# Patient Record
Sex: FEMALE | Race: WHITE | NOT HISPANIC OR LATINO | Employment: FULL TIME | URBAN - METROPOLITAN AREA
[De-identification: names, ages, dates, MRNs, and addresses within clinical notes are randomized per-mention and may not be internally consistent; named-entity substitution may affect disease eponyms.]

---

## 2017-01-25 ENCOUNTER — COMMUNICATION - HEALTHEAST (OUTPATIENT)
Dept: FAMILY MEDICINE | Facility: CLINIC | Age: 52
End: 2017-01-25

## 2017-01-25 ENCOUNTER — OFFICE VISIT - HEALTHEAST (OUTPATIENT)
Dept: FAMILY MEDICINE | Facility: CLINIC | Age: 52
End: 2017-01-25

## 2017-01-25 DIAGNOSIS — I10 ESSENTIAL HYPERTENSION, BENIGN: ICD-10-CM

## 2017-01-25 DIAGNOSIS — R05.9 COUGH: ICD-10-CM

## 2017-01-25 DIAGNOSIS — J18.9 COMMUNITY ACQUIRED PNEUMONIA: ICD-10-CM

## 2017-01-30 ENCOUNTER — AMBULATORY - HEALTHEAST (OUTPATIENT)
Dept: FAMILY MEDICINE | Facility: CLINIC | Age: 52
End: 2017-01-30

## 2017-01-30 ENCOUNTER — COMMUNICATION - HEALTHEAST (OUTPATIENT)
Dept: FAMILY MEDICINE | Facility: CLINIC | Age: 52
End: 2017-01-30

## 2017-01-30 DIAGNOSIS — J18.9 COMMUNITY ACQUIRED PNEUMONIA: ICD-10-CM

## 2017-02-15 ENCOUNTER — COMMUNICATION - HEALTHEAST (OUTPATIENT)
Dept: FAMILY MEDICINE | Facility: CLINIC | Age: 52
End: 2017-02-15

## 2017-09-03 ENCOUNTER — COMMUNICATION - HEALTHEAST (OUTPATIENT)
Dept: FAMILY MEDICINE | Facility: CLINIC | Age: 52
End: 2017-09-03

## 2017-09-03 DIAGNOSIS — F33.1 MODERATE RECURRENT MAJOR DEPRESSION (H): ICD-10-CM

## 2017-12-01 ENCOUNTER — COMMUNICATION - HEALTHEAST (OUTPATIENT)
Dept: FAMILY MEDICINE | Facility: CLINIC | Age: 52
End: 2017-12-01

## 2017-12-01 DIAGNOSIS — F33.1 MODERATE RECURRENT MAJOR DEPRESSION (H): ICD-10-CM

## 2017-12-12 ENCOUNTER — RECORDS - HEALTHEAST (OUTPATIENT)
Dept: ADMINISTRATIVE | Facility: OTHER | Age: 52
End: 2017-12-12

## 2018-02-07 ENCOUNTER — COMMUNICATION - HEALTHEAST (OUTPATIENT)
Dept: FAMILY MEDICINE | Facility: CLINIC | Age: 53
End: 2018-02-07

## 2018-02-07 ENCOUNTER — OFFICE VISIT - HEALTHEAST (OUTPATIENT)
Dept: FAMILY MEDICINE | Facility: CLINIC | Age: 53
End: 2018-02-07

## 2018-02-07 DIAGNOSIS — Z23 NEED FOR VACCINATION: ICD-10-CM

## 2018-02-07 DIAGNOSIS — R42 LIGHTHEADEDNESS: ICD-10-CM

## 2018-02-07 LAB
ANION GAP SERPL CALCULATED.3IONS-SCNC: 8 MMOL/L (ref 5–18)
BUN SERPL-MCNC: 31 MG/DL (ref 8–22)
CALCIUM SERPL-MCNC: 9.7 MG/DL (ref 8.5–10.5)
CHLORIDE BLD-SCNC: 103 MMOL/L (ref 98–107)
CO2 SERPL-SCNC: 28 MMOL/L (ref 22–31)
CREAT SERPL-MCNC: 1.65 MG/DL (ref 0.6–1.1)
GFR SERPL CREATININE-BSD FRML MDRD: 32 ML/MIN/1.73M2
GLUCOSE BLD-MCNC: 85 MG/DL (ref 70–125)
HGB BLD-MCNC: 11 G/DL (ref 12–16)
MAGNESIUM SERPL-MCNC: 2.2 MG/DL (ref 1.8–2.6)
POTASSIUM BLD-SCNC: 5.6 MMOL/L (ref 3.5–5)
SODIUM SERPL-SCNC: 139 MMOL/L (ref 136–145)
TSH SERPL DL<=0.005 MIU/L-ACNC: 1.33 UIU/ML (ref 0.3–5)

## 2018-02-07 ASSESSMENT — MIFFLIN-ST. JEOR: SCORE: 1296.88

## 2018-02-08 ENCOUNTER — COMMUNICATION - HEALTHEAST (OUTPATIENT)
Dept: FAMILY MEDICINE | Facility: CLINIC | Age: 53
End: 2018-02-08

## 2018-02-08 ENCOUNTER — AMBULATORY - HEALTHEAST (OUTPATIENT)
Dept: FAMILY MEDICINE | Facility: CLINIC | Age: 53
End: 2018-02-08

## 2018-02-08 DIAGNOSIS — E87.5 HYPERKALEMIA: ICD-10-CM

## 2018-02-08 DIAGNOSIS — Q61.3 POLYCYSTIC KIDNEY DISEASE: ICD-10-CM

## 2018-02-08 LAB
ATRIAL RATE - MUSE: 46 BPM
DIASTOLIC BLOOD PRESSURE - MUSE: NORMAL MMHG
INTERPRETATION ECG - MUSE: NORMAL
P AXIS - MUSE: 35 DEGREES
PR INTERVAL - MUSE: 174 MS
QRS DURATION - MUSE: 80 MS
QT - MUSE: 476 MS
QTC - MUSE: 416 MS
R AXIS - MUSE: 97 DEGREES
SYSTOLIC BLOOD PRESSURE - MUSE: NORMAL MMHG
T AXIS - MUSE: 68 DEGREES
VENTRICULAR RATE- MUSE: 46 BPM

## 2018-02-12 ENCOUNTER — AMBULATORY - HEALTHEAST (OUTPATIENT)
Dept: LAB | Facility: CLINIC | Age: 53
End: 2018-02-12

## 2018-02-12 DIAGNOSIS — E87.5 HYPERKALEMIA: ICD-10-CM

## 2018-02-12 LAB
ANION GAP SERPL CALCULATED.3IONS-SCNC: 9 MMOL/L (ref 5–18)
BUN SERPL-MCNC: 24 MG/DL (ref 8–22)
CALCIUM SERPL-MCNC: 8.9 MG/DL (ref 8.5–10.5)
CHLORIDE BLD-SCNC: 103 MMOL/L (ref 98–107)
CO2 SERPL-SCNC: 26 MMOL/L (ref 22–31)
CREAT SERPL-MCNC: 1.37 MG/DL (ref 0.6–1.1)
GFR SERPL CREATININE-BSD FRML MDRD: 40 ML/MIN/1.73M2
GLUCOSE BLD-MCNC: 86 MG/DL (ref 70–125)
POTASSIUM BLD-SCNC: 4.6 MMOL/L (ref 3.5–5)
SODIUM SERPL-SCNC: 138 MMOL/L (ref 136–145)

## 2018-03-02 ENCOUNTER — COMMUNICATION - HEALTHEAST (OUTPATIENT)
Dept: FAMILY MEDICINE | Facility: CLINIC | Age: 53
End: 2018-03-02

## 2018-03-02 DIAGNOSIS — F33.1 MODERATE RECURRENT MAJOR DEPRESSION (H): ICD-10-CM

## 2018-05-28 ENCOUNTER — COMMUNICATION - HEALTHEAST (OUTPATIENT)
Dept: FAMILY MEDICINE | Facility: CLINIC | Age: 53
End: 2018-05-28

## 2018-05-28 DIAGNOSIS — F33.1 MODERATE RECURRENT MAJOR DEPRESSION (H): ICD-10-CM

## 2019-01-31 ENCOUNTER — RECORDS - HEALTHEAST (OUTPATIENT)
Dept: ADMINISTRATIVE | Facility: OTHER | Age: 54
End: 2019-01-31

## 2019-02-21 ENCOUNTER — AMBULATORY - HEALTHEAST (OUTPATIENT)
Dept: FAMILY MEDICINE | Facility: CLINIC | Age: 54
End: 2019-02-21

## 2019-02-21 ENCOUNTER — COMMUNICATION - HEALTHEAST (OUTPATIENT)
Dept: FAMILY MEDICINE | Facility: CLINIC | Age: 54
End: 2019-02-21

## 2019-02-21 ENCOUNTER — OFFICE VISIT - HEALTHEAST (OUTPATIENT)
Dept: FAMILY MEDICINE | Facility: CLINIC | Age: 54
End: 2019-02-21

## 2019-02-21 DIAGNOSIS — Z00.00 ROUTINE GENERAL MEDICAL EXAMINATION AT A HEALTH CARE FACILITY: ICD-10-CM

## 2019-02-21 DIAGNOSIS — F32.A DEPRESSION, UNSPECIFIED DEPRESSION TYPE: ICD-10-CM

## 2019-02-21 DIAGNOSIS — I10 ESSENTIAL HYPERTENSION: ICD-10-CM

## 2019-02-21 DIAGNOSIS — F33.1 MODERATE RECURRENT MAJOR DEPRESSION (H): ICD-10-CM

## 2019-02-21 DIAGNOSIS — Z12.31 VISIT FOR SCREENING MAMMOGRAM: ICD-10-CM

## 2019-02-21 LAB
ALBUMIN SERPL-MCNC: 4.1 G/DL (ref 3.5–5)
ALBUMIN UR-MCNC: NEGATIVE MG/DL
ALP SERPL-CCNC: 54 U/L (ref 45–120)
ALT SERPL W P-5'-P-CCNC: 21 U/L (ref 0–45)
ANION GAP SERPL CALCULATED.3IONS-SCNC: 6 MMOL/L (ref 5–18)
APPEARANCE UR: CLEAR
AST SERPL W P-5'-P-CCNC: 29 U/L (ref 0–40)
BACTERIA #/AREA URNS HPF: ABNORMAL HPF
BASOPHILS # BLD AUTO: 0 THOU/UL (ref 0–0.2)
BASOPHILS NFR BLD AUTO: 0 % (ref 0–2)
BILIRUB SERPL-MCNC: 0.7 MG/DL (ref 0–1)
BILIRUB UR QL STRIP: NEGATIVE
BUN SERPL-MCNC: 32 MG/DL (ref 8–22)
CALCIUM SERPL-MCNC: 9.1 MG/DL (ref 8.5–10.5)
CHLORIDE BLD-SCNC: 106 MMOL/L (ref 98–107)
CHOLEST SERPL-MCNC: 200 MG/DL
CO2 SERPL-SCNC: 27 MMOL/L (ref 22–31)
COLOR UR AUTO: YELLOW
CREAT SERPL-MCNC: 1.66 MG/DL (ref 0.6–1.1)
EOSINOPHIL # BLD AUTO: 0.2 THOU/UL (ref 0–0.4)
EOSINOPHIL NFR BLD AUTO: 4 % (ref 0–6)
ERYTHROCYTE [DISTWIDTH] IN BLOOD BY AUTOMATED COUNT: 12.4 % (ref 11–14.5)
FASTING STATUS PATIENT QL REPORTED: YES
GFR SERPL CREATININE-BSD FRML MDRD: 32 ML/MIN/1.73M2
GLUCOSE BLD-MCNC: 72 MG/DL (ref 70–125)
GLUCOSE UR STRIP-MCNC: NEGATIVE MG/DL
HCT VFR BLD AUTO: 34.8 % (ref 35–47)
HDLC SERPL-MCNC: 75 MG/DL
HGB BLD-MCNC: 11.7 G/DL (ref 12–16)
HGB UR QL STRIP: ABNORMAL
KETONES UR STRIP-MCNC: NEGATIVE MG/DL
LDLC SERPL CALC-MCNC: 114 MG/DL
LEUKOCYTE ESTERASE UR QL STRIP: NEGATIVE
LYMPHOCYTES # BLD AUTO: 0.9 THOU/UL (ref 0.8–4.4)
LYMPHOCYTES NFR BLD AUTO: 25 % (ref 20–40)
MCH RBC QN AUTO: 31.5 PG (ref 27–34)
MCHC RBC AUTO-ENTMCNC: 33.6 G/DL (ref 32–36)
MCV RBC AUTO: 94 FL (ref 80–100)
MONOCYTES # BLD AUTO: 0.3 THOU/UL (ref 0–0.9)
MONOCYTES NFR BLD AUTO: 8 % (ref 2–10)
NEUTROPHILS # BLD AUTO: 2.4 THOU/UL (ref 2–7.7)
NEUTROPHILS NFR BLD AUTO: 63 % (ref 50–70)
NITRATE UR QL: NEGATIVE
PH UR STRIP: 5.5 [PH] (ref 5–8)
PLATELET # BLD AUTO: 191 THOU/UL (ref 140–440)
PMV BLD AUTO: 7.6 FL (ref 7–10)
POTASSIUM BLD-SCNC: 4.3 MMOL/L (ref 3.5–5)
PROT SERPL-MCNC: 6.8 G/DL (ref 6–8)
RBC # BLD AUTO: 3.71 MILL/UL (ref 3.8–5.4)
RBC #/AREA URNS AUTO: ABNORMAL HPF
SODIUM SERPL-SCNC: 139 MMOL/L (ref 136–145)
SP GR UR STRIP: <=1.005 (ref 1–1.03)
SQUAMOUS #/AREA URNS AUTO: ABNORMAL LPF
TRIGL SERPL-MCNC: 54 MG/DL
UROBILINOGEN UR STRIP-ACNC: ABNORMAL
WBC #/AREA URNS AUTO: ABNORMAL HPF
WBC: 3.8 THOU/UL (ref 4–11)

## 2019-02-21 RX ORDER — FERROUS SULFATE 325(65) MG
1 TABLET ORAL
Status: SHIPPED | COMMUNITY
Start: 2019-02-21

## 2019-02-21 ASSESSMENT — MIFFLIN-ST. JEOR: SCORE: 1317.75

## 2019-02-22 LAB — 25(OH)D3 SERPL-MCNC: 79.2 NG/ML (ref 30–80)

## 2019-02-24 ENCOUNTER — AMBULATORY - HEALTHEAST (OUTPATIENT)
Dept: FAMILY MEDICINE | Facility: CLINIC | Age: 54
End: 2019-02-24

## 2019-02-24 DIAGNOSIS — D22.9 CHANGE IN MOLE: ICD-10-CM

## 2019-02-25 LAB
BKR LAB AP ABNORMAL BLEEDING: NO
BKR LAB AP BIRTH CONTROL/HORMONES: NORMAL
BKR LAB AP CERVICAL APPEARANCE: NORMAL
BKR LAB AP GYN ADEQUACY: NORMAL
BKR LAB AP GYN INTERPRETATION: NORMAL
BKR LAB AP HPV REFLEX: NORMAL
BKR LAB AP LMP: NORMAL
BKR LAB AP PATIENT STATUS: NORMAL
BKR LAB AP PREVIOUS ABNORMAL: NORMAL
BKR LAB AP PREVIOUS NORMAL: NO
HIGH RISK?: NO
PATH REPORT.COMMENTS IMP SPEC: NORMAL
RESULT FLAG (HE HISTORICAL CONVERSION): NORMAL

## 2019-02-26 ENCOUNTER — COMMUNICATION - HEALTHEAST (OUTPATIENT)
Dept: FAMILY MEDICINE | Facility: CLINIC | Age: 54
End: 2019-02-26

## 2019-02-28 ENCOUNTER — COMMUNICATION - HEALTHEAST (OUTPATIENT)
Dept: FAMILY MEDICINE | Facility: CLINIC | Age: 54
End: 2019-02-28

## 2019-02-28 DIAGNOSIS — F33.1 MODERATE RECURRENT MAJOR DEPRESSION (H): ICD-10-CM

## 2019-03-05 ENCOUNTER — RECORDS - HEALTHEAST (OUTPATIENT)
Dept: ADMINISTRATIVE | Facility: OTHER | Age: 54
End: 2019-03-05

## 2019-03-29 ENCOUNTER — HOSPITAL ENCOUNTER (OUTPATIENT)
Dept: MAMMOGRAPHY | Facility: CLINIC | Age: 54
Discharge: HOME | End: 2019-03-29
Attending: FAMILY MEDICINE

## 2019-03-29 DIAGNOSIS — Z12.31 VISIT FOR SCREENING MAMMOGRAM: ICD-10-CM

## 2019-07-08 ENCOUNTER — RECORDS - HEALTHEAST (OUTPATIENT)
Dept: ADMINISTRATIVE | Facility: OTHER | Age: 54
End: 2019-07-08

## 2019-10-03 ENCOUNTER — RECORDS - HEALTHEAST (OUTPATIENT)
Dept: ADMINISTRATIVE | Facility: OTHER | Age: 54
End: 2019-10-03

## 2020-02-05 ENCOUNTER — COMMUNICATION - HEALTHEAST (OUTPATIENT)
Dept: FAMILY MEDICINE | Facility: CLINIC | Age: 55
End: 2020-02-05

## 2020-02-05 DIAGNOSIS — F33.1 MODERATE RECURRENT MAJOR DEPRESSION (H): ICD-10-CM

## 2020-05-01 ENCOUNTER — COMMUNICATION - HEALTHEAST (OUTPATIENT)
Dept: FAMILY MEDICINE | Facility: CLINIC | Age: 55
End: 2020-05-01

## 2020-05-01 DIAGNOSIS — F33.1 MODERATE RECURRENT MAJOR DEPRESSION (H): ICD-10-CM

## 2020-07-23 ENCOUNTER — AMBULATORY - HEALTHEAST (OUTPATIENT)
Dept: LAB | Facility: CLINIC | Age: 55
End: 2020-07-23

## 2020-07-23 DIAGNOSIS — Q61.2 AUTOSOMAL DOMINANT POLYCYSTIC KIDNEY DISEASE: ICD-10-CM

## 2020-08-01 ENCOUNTER — COMMUNICATION - HEALTHEAST (OUTPATIENT)
Dept: FAMILY MEDICINE | Facility: CLINIC | Age: 55
End: 2020-08-01

## 2020-08-01 DIAGNOSIS — F33.1 MODERATE RECURRENT MAJOR DEPRESSION (H): ICD-10-CM

## 2020-10-13 ENCOUNTER — AMBULATORY - HEALTHEAST (OUTPATIENT)
Dept: FAMILY MEDICINE | Facility: CLINIC | Age: 55
End: 2020-10-13

## 2020-10-13 ENCOUNTER — VIRTUAL VISIT (OUTPATIENT)
Dept: FAMILY MEDICINE | Facility: OTHER | Age: 55
End: 2020-10-13

## 2020-10-13 DIAGNOSIS — Z20.822 SUSPECTED COVID-19 VIRUS INFECTION: ICD-10-CM

## 2020-10-13 NOTE — PROGRESS NOTES
"Date: 10/13/2020 09:56:41  Clinician: Vinny Brown  Clinician NPI: [id number]  Patient: Paty Addison  Patient : 1965  Patient Address: [address] [place] [address], [place] [address]  Patient Phone: [phone]  Visit Protocol: URI  Patient Summary:  Paty is a 55 year old ( : 1965 ) female who initiated a [facility] Visit for COVID-19 (Coronavirus) evaluation and screening. When asked the question \"Please sign me up to receive news, health information and promotions from [facility].\", Paty responded \"Yes\".    Paty states her symptoms started suddenly 2-3 weeks ago.   Her symptoms consist of wheezing, a cough, nasal congestion, rhinitis, and malaise. She is experiencing mild difficulty breathing with activities but can speak normally in full sentences.   Symptom details     Nasal secretions: The color of her mucus is yellow.    Cough: Paty coughs every 5-10 minutes and her cough is not more bothersome at night. Phlegm comes into her throat when she coughs. She does not believe her cough is caused by post-nasal drip. The color of the phlegm is yellow.     Wheezing: Paty has not ever been diagnosed with asthma. Additional wheezing details as reported by the patient (free text): When my mouth is closed I breath through my nose, I can hear the wheezing.        Paty denies having ear pain, headache, fever, enlarged lymph nodes, anosmia, vomiting, nausea, facial pain or pressure, myalgias, chills, sore throat, teeth pain, ageusia, and diarrhea. She also denies double sickening (worsening symptoms after initial improvement), taking antibiotic medication in the past month, and having recent facial or sinus surgery in the past 60 days.   Precipitating events  She has not recently been exposed to someone with influenza. Paty has been in close contact with the following high risk individuals: immunocompromised people.   Pertinent COVID-19 (Coronavirus) information  In the past 14 days, Paty has not worked " in a congregate living setting.   She does not work or volunteer as healthcare worker or a  and does not work or volunteer in a healthcare facility.   Paty also has not lived in a congregate living setting in the past 14 days. She does not live with a healthcare worker.   Paty has not had a close contact with a laboratory-confirmed COVID-19 patient within 14 days of symptom onset.   Since December 2019, Paty and has not had upper respiratory infection or influenza-like illness. Has not been diagnosed with lab-confirmed COVID-19 test   Pertinent medical history  Paty does not get yeast infections when she takes antibiotics.   Paty does not need a return to work/school note.   Weight: 148 lbs   Paty does not smoke or use smokeless tobacco.   Weight: 148 lbs    MEDICATIONS: paroxetine oral, losartan oral, ALLERGIES: NKDA  Clinician Response:  Dear Paty,   Your symptoms show that you may have coronavirus (COVID-19). This illness can cause fever, cough and trouble breathing. Many people get a mild case and get better on their own. Some people can get very sick.  What should I do?  We would like to test you for this virus.   1. Please call [phone] to schedule your visit. Explain that you were referred by [facility] to have a COVID-19 test. Be ready to share your [facility] visit ID number.  The following will serve as your written order for this COVID Test, ordered by me, for the indication of suspected COVID [Z20.828]: The test will be ordered in [employer], our electronic health record, after you are scheduled. It will show as ordered and authorized by Alessandro Hendricks MD.  Order: COVID-19 (Coronavirus) PCR for SYMPTOMATIC testing from [facility].      2. When it's time for your COVID test:  Stay at least 6 feet away from others. (If someone will drive you to your test, stay in the backseat, as far away from the  as you can.)   Cover your mouth and nose with a mask, tissue or washcloth.  Go straight to  "the testing site. Don't make any stops on the way there or back.      3.Starting now: Stay home and away from others (self-isolate) until:   You've had no fever---and no medicine that reduces fever---for one full day (24 hours). And...   Your other symptoms have gotten better. For example, your cough or breathing has improved. And...   At least 10 days have passed since your symptoms started.       During this time, don't leave the house except for testing or medical care.   Stay in your own room, even for meals. Use your own bathroom if you can.   Stay away from others in your home. No hugging, kissing or shaking hands. No visitors.  Don't go to work, school or anywhere else.    Clean \"high touch\" surfaces often (doorknobs, counters, handles, etc.). Use a household cleaning spray or wipes. You'll find a full list of  on the EPA website: www.epa.gov/pesticide-registration/list-n-disinfectants-use-against-sars-cov-2.   Cover your mouth and nose with a mask, tissue or washcloth to avoid spreading germs.  Wash your hands and face often. Use soap and water.  Caregivers in these groups are at risk for severe illness due to COVID-19:  o People 65 years and older  o People who live in a nursing home or long-term care facility  o People with chronic disease (lung, heart, cancer, diabetes, kidney, liver, immunologic)  o People who have a weakened immune system, including those who:   Are in cancer treatment  Take medicine that weakens the immune system, such as corticosteroids  Had a bone marrow or organ transplant  Have an immune deficiency  Have poorly controlled HIV or AIDS  Are obese (body mass index of 40 or higher)  Smoke regularly   o Caregivers should wear gloves while washing dishes, handling laundry and cleaning bedrooms and bathrooms.  o Use caution when washing and drying laundry: Don't shake dirty laundry, and use the warmest water setting that you can.  o For more tips, go to " www.cdc.gov/coronavirus/2019-ncov/downloads/10Things.pdf.    4.Sign up for [employer]. We know it's scary to hear that you might have COVID-19. We want to track your symptoms to make sure you're okay over the next 2 weeks. Please look for an email from [employer]---this is a free, online program that we'll use to keep in touch. To sign up, follow the link in the email. Learn more at [email or web address]  How can I take care of myself?   Get lots of rest. Drink extra fluids (unless a doctor has told you not to).   Take Tylenol (acetaminophen) for fever or pain. If you have liver or kidney problems, ask your family doctor if it's okay to take Tylenol.   Adults can take either:    650 mg (two 325 mg pills) every 4 to 6 hours, or...   1,000 mg (two 500 mg pills) every 8 hours as needed.    Note: Don't take more than 3,000 mg in one day. Acetaminophen is found in many medicines (both prescribed and over-the-counter medicines). Read all labels to be sure you don't take too much.   For children, check the Tylenol bottle for the right dose. The dose is based on the child's age or weight.    If you have other health problems (like cancer, heart failure, an organ transplant or severe kidney disease): Call your specialty clinic if you don't feel better in the next 2 days.       Know when to call 911. Emergency warning signs include:    Trouble breathing or shortness of breath Pain or pressure in the chest that doesn't go away Feeling confused like you haven't felt before, or not being able to wake up Bluish-colored lips or face.  Where can I get more information?    [facility] -- About COVID-19: [email or web address]   CDC -- What to Do If You're Sick: www.cdc.gov/coronavirus/2019-ncov/about/steps-when-sick.html   CDC -- Ending Home Isolation: www.cdc.gov/coronavirus/2019-ncov/hcp/disposition-in-home-patients.html   CDC -- Caring for Someone:  www.cdc.gov/coronavirus/2019-ncov/if-you-are-sick/care-for-someone.html   [facility] -- Interim Guidance for Hospital Discharge to Home: www.health.[facility].[place]/diseases/coronavirus/hcp/hospdischarge.pdf   [facility] clinical trials (COVID-19 research studies): clinicalaffairs.[facility]-clinical-trials    Below are the COVID-19 hotlines at the [place] Department of Health ([facility]). Interpreters are available.    For health questions: Call 809-117-1238 or 1-645.491.7346 (7 a.m. to 7 p.m.) For questions about schools and childcare: Call 322-938-9827 or 1-943.534.4262 (7 a.m. to 7 p.m.)    Diagnosis: Cough  Diagnosis ICD: R05

## 2020-10-15 ENCOUNTER — COMMUNICATION - HEALTHEAST (OUTPATIENT)
Dept: SCHEDULING | Facility: CLINIC | Age: 55
End: 2020-10-15

## 2020-10-20 ENCOUNTER — COMMUNICATION - HEALTHEAST (OUTPATIENT)
Dept: FAMILY MEDICINE | Facility: CLINIC | Age: 55
End: 2020-10-20

## 2020-10-20 DIAGNOSIS — F33.1 MODERATE RECURRENT MAJOR DEPRESSION (H): ICD-10-CM

## 2021-01-16 ENCOUNTER — COMMUNICATION - HEALTHEAST (OUTPATIENT)
Dept: FAMILY MEDICINE | Facility: CLINIC | Age: 56
End: 2021-01-16

## 2021-01-16 DIAGNOSIS — F33.1 MODERATE RECURRENT MAJOR DEPRESSION (H): ICD-10-CM

## 2021-04-06 ENCOUNTER — COMMUNICATION - HEALTHEAST (OUTPATIENT)
Dept: SCHEDULING | Facility: CLINIC | Age: 56
End: 2021-04-06

## 2021-04-06 ENCOUNTER — OFFICE VISIT - HEALTHEAST (OUTPATIENT)
Dept: FAMILY MEDICINE | Facility: CLINIC | Age: 56
End: 2021-04-06

## 2021-04-06 DIAGNOSIS — B02.9 HERPES ZOSTER WITHOUT COMPLICATION: ICD-10-CM

## 2021-04-07 ENCOUNTER — COMMUNICATION - HEALTHEAST (OUTPATIENT)
Dept: FAMILY MEDICINE | Facility: CLINIC | Age: 56
End: 2021-04-07

## 2021-04-07 ENCOUNTER — AMBULATORY - HEALTHEAST (OUTPATIENT)
Dept: FAMILY MEDICINE | Facility: CLINIC | Age: 56
End: 2021-04-07

## 2021-04-07 DIAGNOSIS — B02.8 HERPES ZOSTER WITH OTHER COMPLICATION: ICD-10-CM

## 2021-04-07 RX ORDER — GABAPENTIN 300 MG/1
300 CAPSULE ORAL 3 TIMES DAILY
Qty: 30 CAPSULE | Refills: 0 | Status: SHIPPED | OUTPATIENT
Start: 2021-04-07 | End: 2022-07-14

## 2021-04-13 ENCOUNTER — COMMUNICATION - HEALTHEAST (OUTPATIENT)
Dept: FAMILY MEDICINE | Facility: CLINIC | Age: 56
End: 2021-04-13

## 2021-04-16 ENCOUNTER — COMMUNICATION - HEALTHEAST (OUTPATIENT)
Dept: FAMILY MEDICINE | Facility: CLINIC | Age: 56
End: 2021-04-16

## 2021-04-16 DIAGNOSIS — F33.1 MODERATE RECURRENT MAJOR DEPRESSION (H): ICD-10-CM

## 2021-04-18 RX ORDER — PAROXETINE 20 MG/1
TABLET, FILM COATED ORAL
Qty: 90 TABLET | Refills: 0 | Status: SHIPPED | OUTPATIENT
Start: 2021-04-18 | End: 2021-07-24

## 2021-05-30 ENCOUNTER — HEALTH MAINTENANCE LETTER (OUTPATIENT)
Age: 56
End: 2021-05-30

## 2021-05-30 VITALS — BODY MASS INDEX: 23.61 KG/M2 | WEIGHT: 153 LBS

## 2021-05-31 VITALS — HEIGHT: 68 IN | WEIGHT: 148 LBS | BODY MASS INDEX: 22.43 KG/M2

## 2021-06-02 VITALS — WEIGHT: 152.6 LBS | BODY MASS INDEX: 23.13 KG/M2 | HEIGHT: 68 IN

## 2021-06-05 VITALS
RESPIRATION RATE: 16 BRPM | DIASTOLIC BLOOD PRESSURE: 73 MMHG | SYSTOLIC BLOOD PRESSURE: 135 MMHG | BODY MASS INDEX: 23.25 KG/M2 | TEMPERATURE: 98.2 F | WEIGHT: 150.7 LBS | OXYGEN SATURATION: 98 % | HEART RATE: 63 BPM

## 2021-06-05 NOTE — TELEPHONE ENCOUNTER
Refill Approved    Rx renewed per Medication Renewal Policy. Medication was last renewed on 2/21/19.    Alis Hernandez, [facility] Triage/Med Refill 2/5/2020     Requested Prescriptions   Pending Prescriptions Disp Refills     PARoxetine (PAXIL) 20 MG tablet [Pharmacy Med Name: PAROXETINE 20MG TABLETS] 90 tablet 3     Sig: TAKE 1 TABLET(20 MG) BY MOUTH AT BEDTIME       [facility] Refill Protocol  Passed - 2/5/2020  5:13 AM        Passed - PCP or prescribing provider visit in last year     Last office visit with prescriber/PCP: Visit date not found OR same dept: Visit date not found OR same specialty: 2/7/2018 Caterina Cruz MD  Last physical: 2/21/2019 Last MTM visit: Visit date not found   Next visit within 3 mo: Visit date not found  Next physical within 3 mo: Visit date not found  Prescriber OR PCP: Dwayne López MD  Last diagnosis associated with med order: 1. Moderate recurrent major depression (H)  - PARoxetine (PAXIL) 20 MG tablet [Pharmacy Med Name: PAROXETINE 20MG TABLETS]; TAKE 1 TABLET(20 MG) BY MOUTH AT BEDTIME  Dispense: 90 tablet; Refill: 3    If protocol passes may refill for 12 months if within 3 months of last provider visit (or a total of 15 months).                         
EOMI; PERRL; no drainage or redness

## 2021-06-07 NOTE — TELEPHONE ENCOUNTER
RN cannot approve Refill Request    RN can NOT refill this medication PCP messaged that patient is overdue for Office Visit. Last office visit: Visit date not found Last Physical: 2/21/2019 Last MTM visit: Visit date not found Last visit same specialty: 2/7/2018 Caterina Cruz MD.  Next visit within 3 mo: Visit date not found  Next physical within 3 mo: Visit date not found      Marla Perkins, [facility] Triage/Med Refill 5/2/2020    Requested Prescriptions   Pending Prescriptions Disp Refills     PARoxetine (PAXIL) 20 MG tablet [Pharmacy Med Name: PAROXETINE 20MG TABLETS] 90 tablet 0     Sig: TAKE 1 TABLET(20 MG) BY MOUTH AT BEDTIME       [facility] Refill Protocol  Failed - 5/1/2020  1:04 PM        Failed - PCP or prescribing provider visit in last year     Last office visit with prescriber/PCP: Visit date not found OR same dept: Visit date not found OR same specialty: 2/7/2018 Caterina Cruz MD  Last physical: 2/21/2019 Last MTM visit: Visit date not found   Next visit within 3 mo: Visit date not found  Next physical within 3 mo: Visit date not found  Prescriber OR PCP: Dwayne López MD  Last diagnosis associated with med order: 1. Moderate recurrent major depression (H)  - PARoxetine (PAXIL) 20 MG tablet [Pharmacy Med Name: PAROXETINE 20MG TABLETS]; TAKE 1 TABLET(20 MG) BY MOUTH AT BEDTIME  Dispense: 90 tablet; Refill: 0    If protocol passes may refill for 12 months if within 3 months of last provider visit (or a total of 15 months).

## 2021-06-08 NOTE — PROGRESS NOTES
Subjective:    Paty Addison is seen today for ongoing cough.  Worse since this weekend.  Deep.  Harsh.  Productive at times.  Using losartan 25 mg twice daily for hypertension as well as paroxetine 20 mg daily for depression management currently in remission.  No fevers or chills.  No shortness of breath.  No recent immobilization.  No calf pain.  No ankle swelling.  Using zinc OTC for cough otherwise no other cough suppressants utilized.  Review of systems as above otherwise all negative.     -   1 daughter (22) - adopted from family member  Tobacco: none  EtOH: none  Mom -  83 [employer]'s  Dad -  58 CMV complications after kidney transplant in   14 siblings  Muslim - now attending [employer] in [place]  Surgeries: rhinoplasty; double jaw surgery  Hospitalizations: depression   Work:  for [employer] in  from [place], [place] area    History reviewed. No pertinent past surgical history.     History reviewed. No pertinent family history.     History reviewed. No pertinent past medical history.     Social History   Substance Use Topics     Smoking status: Never Smoker     Smokeless tobacco: Never Used     Alcohol use None        Current Outpatient Prescriptions   Medication Sig Dispense Refill     CA COMB NO.1/VIT D3/B-6/FA/B12 (VITAMIN D3, CALCIUM CIT-PHOS, ORAL) Take by mouth.       losartan (COZAAR) 25 MG tablet Take 1 tablet by mouth 2 (two) times a day.        PARoxetine (PAXIL) 20 MG tablet TAKE 1 TABLET BY MOUTH AT BEDTIME 90 tablet 2     azithromycin (ZITHROMAX) 250 MG tablet Take 2 tabs on day one, and then 1 tab on days 2-5. 6 tablet 0     No current facility-administered medications for this visit.           Objective:    Vitals:    17 1615   BP: 120/80   Pulse: 66   Temp: 98  F (36.7  C)   Weight: 153 lb (69.4 kg)      Body mass index is 23.61 kg/(m^2).    Alert.  No apparent distress.  Chest with right greater than the left sided  rhonchi noted on exam without inspiratory crackles or expiratory wheeze.  Cardiac exam regular.  HEENT exam with conjunctiva clear.  TMs nasopharynx and oropharynx normal.  Moist mucous membranes.      Assessment:    1. Community acquired pneumonia  XR Chest PA and Lateral    azithromycin (ZITHROMAX) 250 MG tablet   2. Cough     3. Benign Essential Hypertension           Plan:    Clinical exam consistent with community-acquired pneumonia.  Chest x-ray appears negative.  Z-Enrrique was prescribed.  Robitussin-DM or Mucinex DM for cough suppressant and expectorant were discussed.  Continue losartan 25 mg twice daily with appropriate blood pressure control noted currently.  We'll continue to monitor.

## 2021-06-10 NOTE — TELEPHONE ENCOUNTER
RN cannot approve Refill Request    RN can NOT refill this medication PCP messaged that patient is overdue for Office Visit. Last office visit: Visit date not found Last Physical: 2/21/2019 Last MTM visit: Visit date not found Last visit same specialty: 2/7/2018 Caterina Cruz MD.  Next visit within 3 mo: Visit date not found  Next physical within 3 mo: Visit date not found      Marla Perkins, [facility] Triage/Med Refill 8/2/2020    Requested Prescriptions   Pending Prescriptions Disp Refills     PARoxetine (PAXIL) 20 MG tablet [Pharmacy Med Name: PAROXETINE 20MG TABLETS] 90 tablet 0     Sig: TAKE 1 TABLET(20 MG) BY MOUTH AT BEDTIME       [facility] Refill Protocol  Failed - 8/1/2020 10:01 AM        Failed - PCP or prescribing provider visit in last year     Last office visit with prescriber/PCP: Visit date not found OR same dept: Visit date not found OR same specialty: 2/7/2018 Caterina Cruz MD  Last physical: 2/21/2019 Last MTM visit: Visit date not found   Next visit within 3 mo: Visit date not found  Next physical within 3 mo: Visit date not found  Prescriber OR PCP: Dwayne López MD  Last diagnosis associated with med order: 1. Moderate recurrent major depression (H)  - PARoxetine (PAXIL) 20 MG tablet [Pharmacy Med Name: PAROXETINE 20MG TABLETS]; TAKE 1 TABLET(20 MG) BY MOUTH AT BEDTIME  Dispense: 90 tablet; Refill: 0    If protocol passes may refill for 12 months if within 3 months of last provider visit (or a total of 15 months).

## 2021-06-12 NOTE — TELEPHONE ENCOUNTER
RN cannot approve Refill Request    RN can NOT refill this medication Protocol failed and NO refill given. Last office visit: Visit date not found Last Physical: 2/21/2019 Last MTM visit: Visit date not found Last visit same specialty: 2/7/2018 Caterina Cruz MD.  Next visit within 3 mo: Visit date not found  Next physical within 3 mo: Visit date not found      Alis Hernandez, [facility] Connection Triage/Med Refill 10/22/2020    Requested Prescriptions   Pending Prescriptions Disp Refills     PARoxetine (PAXIL) 20 MG tablet [Pharmacy Med Name: PAROXETINE 20MG TABLETS] 90 tablet 0     Sig: TAKE 1 TABLET(20 MG) BY MOUTH AT BEDTIME       [facility] Refill Protocol  Failed - 10/20/2020  2:36 PM        Failed - PCP or prescribing provider visit in last year     Last office visit with prescriber/PCP: Visit date not found OR same dept: Visit date not found OR same specialty: 2/7/2018 Caterina Cruz MD  Last physical: 2/21/2019 Last MTM visit: Visit date not found   Next visit within 3 mo: Visit date not found  Next physical within 3 mo: Visit date not found  Prescriber OR PCP: Dwayne López MD  Last diagnosis associated with med order: 1. Moderate recurrent major depression (H)  - PARoxetine (PAXIL) 20 MG tablet [Pharmacy Med Name: PAROXETINE 20MG TABLETS]; TAKE 1 TABLET(20 MG) BY MOUTH AT BEDTIME  Dispense: 90 tablet; Refill: 0    If protocol passes may refill for 12 months if within 3 months of last provider visit (or a total of 15 months).

## 2021-06-14 NOTE — TELEPHONE ENCOUNTER
RN cannot approve Refill Request    RN can NOT refill this medication PCP messaged that patient is overdue for Office Visit. Last office visit: Visit date not found Last Physical: 2/21/2019 Last MTM visit: Visit date not found Last visit same specialty: 2/7/2018 Caterina Cruz MD.  Next visit within 3 mo: Visit date not found  Next physical within 3 mo: Visit date not found      Marla Perkins, [facility] Triage/Med Refill 1/16/2021    Requested Prescriptions   Pending Prescriptions Disp Refills     PARoxetine (PAXIL) 20 MG tablet [Pharmacy Med Name: PAROXETINE 20MG TABLETS] 90 tablet 0     Sig: TAKE 1 TABLET(20 MG) BY MOUTH AT BEDTIME       [facility] Refill Protocol  Failed - 1/16/2021  5:22 AM        Failed - PCP or prescribing provider visit in last year     Last office visit with prescriber/PCP: Visit date not found OR same dept: Visit date not found OR same specialty: 2/7/2018 Caterina Cruz MD  Last physical: 2/21/2019 Last MTM visit: Visit date not found   Next visit within 3 mo: Visit date not found  Next physical within 3 mo: Visit date not found  Prescriber OR PCP: Dwayne López MD  Last diagnosis associated with med order: 1. Moderate recurrent major depression (H)  - PARoxetine (PAXIL) 20 MG tablet [Pharmacy Med Name: PAROXETINE 20MG TABLETS]; TAKE 1 TABLET(20 MG) BY MOUTH AT BEDTIME  Dispense: 90 tablet; Refill: 0    If protocol passes may refill for 12 months if within 3 months of last provider visit (or a total of 15 months).

## 2021-06-15 NOTE — PROGRESS NOTES
Assessment/Plan:     1. Lightheadedness  We discussed broad differential diagnosis and underlying causes.  Will assess further with electrolytes, globin, and thyroid cascade as noted.  I have advised that she monitor her heart rate at home as bradycardia could be contributing.  Would have low threshold for Holter monitor.  Unlikely to be related to her mitral valve in light of lack of symptoms with exercise, but could consider further evaluation of her mitral valve if persistent as well.  Advised adequate hydration, liberalization of sodium intake mildly, and will monitor symptoms.  - Electrocardiogram Perform and Read  - Hemoglobin  - Basic Metabolic Panel  - Magnesium  - Thyroid Modoc    2. Need for vaccinatio  - Influenza, Seasonal,Quad Inj, 36+ MOS      Subjective:      Paty Addison is a 53 y.o. female presenting to clinic today for evaluation of episodes of lightheadedness.  They have occurred twice.  One occurred 2 days ago when she was standing at the counter in the kitchen and suddenly felt faint.  She touched the counter with her hand and that seemed to help.  It lasted only seconds.  There were no associated symptoms, specifically there is no chest pain, nausea, diaphoresis, palpitations, or shortness of breath.  Today she was at her work desk at work, she was standing, and after sneezing had a brief episode of feeling faint again.  Denies any vertigo.  This time she felt as though her arms very briefly tangled, resolved as quickly as it began.  She had a little bit of nasal drainage, there were some black specks in it which was different.  Her nose has been running a little bit.  She denies any fevers, chills, cough, or other signs of illness.  She has been more tired than usual.  She is a history of mitral valve disease, has a heart murmur, she has not had this assessed recently.  Notes that in general her heart rate tends to run in the 50s, this is stable for her.  She works out regularly without  any difficulties, has added 1 additional workout a week on Sunday mornings, this was separate from the times that she had the episodes.  She is a history of autosomal dominant polycystic kidney disease, is followed by nephrology, known to have very mild anemia of 11.6 at her December visit.  Her menses are regular, last menstrual period was December 31, there will not heavier but the amount of flow has changed a little bit.  She denies any other additional factors.    She is interested in the flu shot today.    Current Outpatient Prescriptions   Medication Sig Dispense Refill     CA COMB NO.1/VIT D3/B-6/FA/B12 (VITAMIN D3, CALCIUM CIT-PHOS, ORAL) Take by mouth.       losartan (COZAAR) 25 MG tablet Take 1 tablet by mouth 2 (two) times a day.        PARoxetine (PAXIL) 20 MG tablet TAKE 1 TABLET BY MOUTH AT BEDTIME 90 tablet 0     azithromycin (ZITHROMAX) 250 MG tablet Take 2 tabs on day one, and then 1 tab on days 2-5. 6 tablet 0     No current facility-administered medications for this visit.        Past Medical History, Family History, and Social History reviewed.  No past medical history on file.  No past surgical history on file.  Review of patient's allergies indicates no known allergies.  No family history on file.  Social History     Social History     Marital status:      Spouse name: N/A     Number of children: N/A     Years of education: N/A     Occupational History     Not on file.     Social History Main Topics     Smoking status: Never Smoker     Smokeless tobacco: Never Used     Alcohol use Not on file     Drug use: Not on file     Sexual activity: Not on file     Other Topics Concern     Not on file     Social History Narrative         Review of systems is as stated in HPI, and the remainder of the 10 system review is otherwise negative.    Objective:     Vitals:    02/07/18 1337   BP: 112/64   Pulse: (!) 50   Resp: 16   Temp: 98  F (36.7  C)   TempSrc: Oral   SpO2: 99%   Weight: 148 lb (67.1 kg)  "  Height: 5' 7.5\" (1.715 m)    Body mass index is 22.84 kg/(m^2).    Alert and pleasant female appearing stated age.  Pupils equal, round, and reactive to light.  Tympanic membrane is pearly and translucent.  Oropharynx is clear.  Face moves symmetrically.  Neck supple without lymphadenopathy or thyromegaly.  No carotid bruits.  Heart is with regular rate and rhythm approximately 55 bpm.  2/6 systolic murmur at the apex.  Lungs clear.  Abdomen soft and nontender.  Extremities without edema.  5 out of 5 strength in all 4 extremities.  No balance difficulties.    I ordered and personally reviewed a 12-lead EKG which reveals sinus bradycardia at 46 bpm and a rightward axis, no significant change from EKG July 2016 except that PVCs are not present.      This note has been dictated using voice recognition software. Any grammatical or context distortions are unintentional and inherent to the the software.   "

## 2021-06-16 PROBLEM — Q61.3 POLYCYSTIC KIDNEY DISEASE: Status: ACTIVE | Noted: 2018-02-08

## 2021-06-16 NOTE — TELEPHONE ENCOUNTER
Tressa pharmacist calling from [facility] with medication question regarding the quantity of medication patient should take.     valACYclovir (VALTREX) 500 MG tablet 14 tablet 0 4/6/2021 4/13/2021 --   Sig - Route: Take 2 tablets (1,000 mg total) by mouth 2 (two) times a day for 7 days. - Oral   Sent to pharmacy as: valACYclovir 500 mg tablet (Valtrex)   Notes to Pharmacy: Has stage 3 kidney disease. For shingles.   E-Prescribing Status: Receipt confirmed by pharmacy (4/6/2021  6:51 PM CDT     RN called [facility] and was connected with provider Abeba English PA-C who states patient should take 2 tablets (1,000 mg total) by mouth 2 (two) times a day for 7 days which would make the quantity 28.     RN called Pharmacist Tressa and informed of the above message from provider.     Chandra Gottlieb RN  [facility]

## 2021-06-16 NOTE — TELEPHONE ENCOUNTER
RN cannot approve Refill Request    RN can NOT refill this medication PCP messaged that patient is overdue for Office Visit. Last office visit: Visit date not found Last Physical: 2/21/2019 Last MTM visit: Visit date not found Last visit same specialty: 2/7/2018 Caterina Cruz MD.  Next visit within 3 mo: Visit date not found  Next physical within 3 mo: Visit date not found      Marla Perkins, [facility] Triage/Med Refill 4/16/2021    Requested Prescriptions   Pending Prescriptions Disp Refills     PARoxetine (PAXIL) 20 MG tablet [Pharmacy Med Name: PAROXETINE 20MG TABLETS] 90 tablet 0     Sig: TAKE 1 TABLET(20 MG) BY MOUTH AT BEDTIME       SSRI Refill Protocol  Failed - 4/16/2021  5:23 AM        Failed - PCP or prescribing provider visit in last year     Last office visit with prescriber/PCP: Visit date not found OR same dept: Visit date not found OR same specialty: 2/7/2018 Caterina Cruz MD  Last physical: 2/21/2019 Last MTM visit: Visit date not found   Next visit within 3 mo: Visit date not found  Next physical within 3 mo: Visit date not found  Prescriber OR PCP: Dwayne López MD  Last diagnosis associated with med order: 1. Moderate recurrent major depression (H)  - PARoxetine (PAXIL) 20 MG tablet [Pharmacy Med Name: PAROXETINE 20MG TABLETS]; TAKE 1 TABLET(20 MG) BY MOUTH AT BEDTIME  Dispense: 90 tablet; Refill: 0    If protocol passes may refill for 12 months if within 3 months of last provider visit (or a total of 15 months).

## 2021-06-16 NOTE — TELEPHONE ENCOUNTER
Pt called in states she has rash on the last side under her breast.  The symptom started 3 days ago.  The rash red look burn.  The rash is localized unable to see it.  It has mild itching but there pain.  The pain is 10/10 on the scale.  No fever.  The disposition is to be seen with in the next 24 hours.  Care advice given per protocol.  Patient agrees with care advice given.   Agreed to call back if he has additional symptoms or questions.      Chico Cavazos RN, [facility] Triage/Med Refill 4/6/2021 5:35 PM      Reason for Disposition    SEVERE pain (e.g., excruciating)    Additional Information    Negative: Difficult to awaken or acting confused (e.g., disoriented, slurred speech)    Negative: Sounds like a life-threatening emergency to the triager    Negative: [1] Localized rash AND [2] doesn't match the SYMPTOMS of shingles    Negative: [1] Back pain AND [2] doesn't match the SYMPTOMS of shingles    Negative: Shingles Vaccine (Recombinant Zoster Vaccine; RZV; Shingrix), questions about    Negative: Patient sounds very sick or weak to the triager    Negative: [1] Shingles rash (matches SYMPTOMS) AND [2] weak immune system (e.g., HIV positive,  cancer chemotherapy, chronic steroid treatment, splenectomy) AND [3] NOT taking antiviral medication    Negative: Shingles rash on the eyelid or tip of the nose    Negative: [1] Shingles rash of face AND [2] eye pain or blurred vision    Negative: [1] Shingles rash of face AND [2] facial weakness    Negative: [1] Shingles rash of face or ear AND [2] earache or ringing in the ear    Negative: [1] Shingles rash AND [2] spots start appearing other places on body    Negative: Fever > 100.5 F (38.1 C)    Protocols used: SHINGLES-A-AH

## 2021-06-16 NOTE — TELEPHONE ENCOUNTER
Reason contacted:  General question  Information relayed:    Patient is calling wondering if she can work out when she has shingles?     Patient states she usually exercises 6 days per week and hasn't exercised since she was diagnosed with shingles on 4/6/2021.    Please advise   Additional questions:  No  Further follow-up needed:  Yes  Okay to leave a detailed message:  Yes [phone]

## 2021-06-16 NOTE — TELEPHONE ENCOUNTER
Reason contacted:  Medication question  Information relayed:    Patient was seen at walk-in care yesterday and diagnosed with Shingles.    She states she spoke with friends who recommended she obtain a prescription for Shingles pain called gabapentin.    Patient is wondering if Dr. López would be able to prescribe gabapentin for her pain?   She uses the [facility] in [place].    Please advise   Additional questions:  No  Further follow-up needed:  Yes  Okay to leave a detailed message:  Yes

## 2021-06-18 NOTE — LETTER
Letter by Dwayne López MD at      Author: Dwayne López MD Service: -- Author Type: --    Filed:  Encounter Date: 2/26/2019 Status: (Other)       Paty Addison  [address] [place] [address]             February 26, 2019         Dear Ms. Addison,    Below are the results from your recent visit:    Resulted Orders   Comprehensive Metabolic Panel   Result Value Ref Range    Sodium 139 136 - 145 mmol/L    Potassium 4.3 3.5 - 5.0 mmol/L    Chloride 106 98 - 107 mmol/L    CO2 27 22 - 31 mmol/L    Anion Gap, Calculation 6 5 - 18 mmol/L    Glucose 72 70 - 125 mg/dL    BUN 32 (H) 8 - 22 mg/dL    Creatinine 1.66 (H) 0.60 - 1.10 mg/dL    GFR MDRD Af Amer 39 (L) >60 mL/min/1.73m2    GFR MDRD Non Af Amer 32 (L) >60 mL/min/1.73m2    Bilirubin, Total 0.7 0.0 - 1.0 mg/dL    Calcium 9.1 8.5 - 10.5 mg/dL    Protein, Total 6.8 6.0 - 8.0 g/dL    Albumin 4.1 3.5 - 5.0 g/dL    Alkaline Phosphatase 54 45 - 120 U/L    AST 29 0 - 40 U/L    ALT 21 0 - 45 U/L    Narrative    Fasting Glucose reference range is 70-99 mg/dL per  American Diabetes Association (ADA) guidelines.   Lipid Cascade   Result Value Ref Range    Cholesterol 200 (H) <=199 mg/dL    Triglycerides 54 <=149 mg/dL    HDL Cholesterol 75 >=50 mg/dL    LDL Calculated 114 <=129 mg/dL    Patient Fasting > 8hrs? Yes    Vitamin D, Total (25-Hydroxy)   Result Value Ref Range    Vitamin D, Total (25-Hydroxy) 79.2 30.0 - 80.0 ng/mL    Narrative    Deficiency <10.0 ng/mL  Insufficiency 10.0-29.9 ng/mL  Sufficiency 30.0-80.0 ng/mL  Toxicity (possible) >100.0 ng/mL   Urinalysis-UC if Indicated   Result Value Ref Range    Color, UA Yellow Colorless, Yellow, Straw, Light Yellow    Clarity, UA Clear Clear    Glucose, UA Negative Negative    Bilirubin, UA Negative Negative    Ketones, UA Negative Negative    Specific Gravity, UA <=1.005 1.005 - 1.030    Blood, UA Trace (!) Negative    pH, UA 5.5 5.0 - 8.0    Protein, UA Negative Negative mg/dL    Urobilinogen, UA 0.2 E.U./dL 0.2  E.U./dL, 1.0 E.U./dL    Nitrite, UA Negative Negative    Leukocytes, UA Negative Negative    Bacteria, UA Few (!) None Seen hpf    RBC, UA 0-2 None Seen, 0-2 hpf    WBC, UA 0-5 None Seen, 0-5 hpf    Squam Epithel, UA 0-5 None Seen, 0-5 lpf    Narrative    UC not indicated   HM1 (CBC with Diff)   Result Value Ref Range    WBC 3.8 (L) 4.0 - 11.0 thou/uL    RBC 3.71 (L) 3.80 - 5.40 mill/uL    Hemoglobin 11.7 (L) 12.0 - 16.0 g/dL    Hematocrit 34.8 (L) 35.0 - 47.0 %    MCV 94 80 - 100 fL    MCH 31.5 27.0 - 34.0 pg    MCHC 33.6 32.0 - 36.0 g/dL    RDW 12.4 11.0 - 14.5 %    Platelets 191 140 - 440 thou/uL    MPV 7.6 7.0 - 10.0 fL    Neutrophils % 63 50 - 70 %    Lymphocytes % 25 20 - 40 %    Monocytes % 8 2 - 10 %    Eosinophils % 4 0 - 6 %    Basophils % 0 0 - 2 %    Neutrophils Absolute 2.4 2.0 - 7.7 thou/uL    Lymphocytes Absolute 0.9 0.8 - 4.4 thou/uL    Monocytes Absolute 0.3 0.0 - 0.9 thou/uL    Eosinophils Absolute 0.2 0.0 - 0.4 thou/uL    Basophils Absolute 0.0 0.0 - 0.2 thou/uL   Gynecologic Cytology (PAP Smear)   Result Value Ref Range    Case Report       Gynecologic Cytology Report                       Case: [id number]                                   Authorizing Provider:  Dwayne López MD         Collected:           02/21/2019 0818              Ordering Location:     [facility] Received:            02/21/2019 0818              First Screen:          [place] [facility]                                                                           (ASCP)                                                                       Specimen:    SUREPATH PAP, SCREENING, Endocervical/cervical                                             Interpretation  Negative for squamous intraepithelial lesion or malignancy.      Negative for squamous intraepithelial lesion or malignancy    Result Flag Normal Normal    Specimen Adequacy       Satisfactory for evaluation, endocervical/transformation zone component  present    HPV Reflex? Yes if Abnormal     HIGH RISK No     LMP/Menopause Date 12/18/2018     Abnormal Bleeding No     Pt Status Not applicable     Birth Control/Hormones None     Previous Normal/Date no     Prev Abn Date/Dx none     Cervical Appearance Normal        Normal.. Repeat in 5 years    Please call with questions or contact us using [employer].    Sincerely,        Electronically signed by Dwayne López MD

## 2021-06-18 NOTE — PATIENT INSTRUCTIONS - HE
Patient Instructions by Abeba English PA-C at 4/6/2021  6:25 PM     Author: Abeba English PA-C Service: -- Author Type: Physician Assistant    Filed: 4/6/2021  6:56 PM Encounter Date: 4/6/2021 Status: Addendum    : Abeba English PA-C (Physician Assistant)    Related Notes: Original Note by Abeba English PA-C (Physician Assistant) filed at 4/6/2021  6:52 PM       Patient was educated on the natural course of condition and potential complications (post-herpetic neuralgia). Take medication as directed. Side effects of prednisone may include anxiety, increased hunger, and insomnia. Valtrex dose adjusted for stage 3 kidney disease. To prevent the spread make sure you avoid direct contact with others. Keep rash covered if possible until rash has completely crusted over. Conservative measures discussed including over-the-counter analgesics (Tylenol) and lidocaine patches. See your primary care provider if symptoms worsen or do not improve in 7 days. Seek emergency care if you develop severe pain/redness, shortness of breath, or confusion.     Patient Education     Shingles  Shingles is a viral infection caused by the same virus as chicken pox. Anyone who has had chicken pox may get shingles later in life. The virus stays in the body, but remains dormant (asleep). Shingles often occurs in older persons or persons with lowered immunity. But it can affect anyone at any age.  Shingles starts as a tingling patch of skin on one side of the body. Small, painful blisters may then appear. The rash does not spread to the rest of the body.  Exposure to shingles cannot cause shingles. However, it can cause chicken pox in anyone who has not had chicken pox or has not been vaccinated. The contagious period ends when all blisters have crusted over (generally about 2 weeks after the illness begins).  After the blisters heal, the affected skin may be sensitive or painful for months (neuralgia). This often gradually goes away.  A  shingles vaccine is available. This can help prevent shingles or make it less painful. It is generally recommended for adults over the age of 60 who have had chicken pox in the past, but who have never had shingles. Adults over 60 who have had neither chicken pox nor shingles can prevent both diseases with the chicken pox vaccine. Ask your healthcare provider about these vaccines.  Home care    Medicines may be prescribed to help relieve pain. Take these medicines as directed. Ask your healthcare provider or pharmacist before using over-the-counter medicines for helping treat pain and itching.    In certain cases, antiviral medicines may be prescribed to reduce pain, shorten the illness, and prevent neuralgia. Take these medicines as directed.    Compresses made from a solution of cool water mixed with cornstarch or baking soda may help relieve pain and itching.     Gently wash skin daily with soap and water to help prevent infection.  Be certain to rinse off all of the soap, which can be irritating.    Trim fingernails and try not to scratch. Scratching the sores may leave scars.    Stay home from work or school until all blisters have formed a crust and you are no longer contagious.  Follow-up care  Follow up with your healthcare provider or as directed by our staff.  When to seek medical advice    Fever of 100.4 F (38 C) or higher, or as directed by your healthcare provider    Affected skin is on the face or neck and any of the following occur:  ? Headache  ? Eye pain  ? Changes in vision  ? Sores near the eye  ? Weakness of facial muscles    Pain, redness, or swelling of a joint    Signs of skin infection: colored drainage from the sores, warmth, increasing redness, or increasing pain  Date Last Reviewed: 9/25/2015 2000-2017 [employer]. [address] [place] [address]. All rights reserved. This information is not intended as a substitute for professional medical care. Always follow  your healthcare professional's instructions.

## 2021-06-24 NOTE — TELEPHONE ENCOUNTER
"Referral Request  Type of referral: Dermatologist  Who s requesting: Patient  Why the request: small cyst in the preauricular area & per patient an \"overall body scan/skin check\"  Have you been seen for this request: Yes  Does patient have a preference on a group/provider? [facility]  Fax: [phone]  Okay to leave a detailed message?  Yes    "

## 2021-06-24 NOTE — TELEPHONE ENCOUNTER
Who is calling:  Patient  Reason for Call:  She had asked for her dermatology referral to to be sent to [facility] in her inicial request from 2/21/2019, but it was sent to [facility] instead. She is scheduled with [facility] tomorrow, 3/05/2019, and would like this fixed as soon as possible.   Date of last appointment with primary care: 2/21/2019  Has the patient been recently seen:  Yes  Okay to leave a detailed message: Yes

## 2021-06-24 NOTE — TELEPHONE ENCOUNTER
Order faxed over to [facility] per pt/Liliya request (apt 03.05.2019)  Spoke w/Paty to reassure her that the order was resent to [facility]

## 2021-06-24 NOTE — PROGRESS NOTES
"CC: I am here for a physical.  I also need a Pap smear.    HPI Paty returns for a physical.  The patient has polycystic kidney disease.  She is about to be evaluated for use of an experimental FDA approved drug for polycystic kidney disease.  She enjoys good health she does have hypertensive vascular disease she does get losartan from her nephrologist.  She has been on Paxil for many years her depression scale today is 6.  She would like her pills refilled.  Last period was 3 months ago.  She has no menometrorrhagia she denies any headaches visual disturbances chest pain shortness of breath dyspnea abdominal pain diarrhea constipation urgency frequency dysuria she is due for a mole check.  On examination today we did find a small cyst in the preauricular area which I will defer to dermatology to I&D.  Patient's immunizations will also be updated today.  All medical questions that were asked were answered I personally reviewed family social history surgeries allergies problems list patient became a grandmother since I last saw her in the she and her  enjoy their new rules.  She will contact me with regards to whether she is a candidate for this polycystic kidney new medication.  In the meantime her colonoscopy is due in about 7 years.  Mammography annually.      Review of Systems: A complete 14 point review of systems was obtained and is negative or as stated in the history of present illness.    No past medical history on file.  No family history on file.  No past surgical history on file.  Social History     Tobacco Use     Smoking status: Never Smoker     Smokeless tobacco: Never Used   Substance Use Topics     Alcohol use: Not on file     Drug use: Not on file       PE:   /76   Pulse (!) 59   Ht 5' 7.5\" (1.715 m)   Wt 152 lb 9.6 oz (69.2 kg)   SpO2 99%   BMI 23.55 kg/m       General Appearance:  Alert, cooperative, no distress  Head:  Normocephalic, no obvious abnormality  Ears: TM anatomy " normal  Eyes:  PERRL, EOM's intact, conjunctiva and corneas clear; patient has a small preauricular cyst measuring 2 mm which should be I indeed by dermatology  Nose:  Nares symmetrical, septum midline, mucosa pink, no sinus tenderness  Throat:  Lips, tongue, and mucosa are moist, pink, and intact  Neck:  Supple, symmetrical, trachea midline, no adenopathy; thyroid: no enlargement, symmetric,no tenderness/mass/nodules; no carotid bruit, no JVD  Back:  Symmetrical, no curvature, ROM normal, no CVA tenderness  Chest/Breast:  No mass or tenderness  Lungs:  Clear to auscultation bilaterally, respirations unlabored   Heart:  Normal PMI, regular rate & rhythm, S1 and S2 normal, no murmurs, rubs, or gallops  Abdomen:  Soft, non-tender, bowel sounds active all four quadrants, no mass, or organomegaly  Musculoskeletal:  Tone and strength strong and symmetrical, all extremities  Lymphatic:  No adenopathy  Skin/Hair/Nails:  Skin warm, dry, and intact, no rashes  Neurologic:  Alert and oriented x3, no cranial nerve deficits, normal strength and tone, gait steady  Extremities:  No edema.  Chanel's sign negative.    Genitourinary: External genitalia normal BUS normal vault clear small cervical erosion noted differential smear taken STD check not indicated.  Pulses:  Equal bilaterally    Impression:     1. Routine general medical examination at a health care facility  Comprehensive Metabolic Panel    HM1(CBC and Differential)    Lipid Cascade    Urinalysis-UC if Indicated    HM1 (CBC with Diff)    Gynecologic Cytology (PAP Smear)    CANCELED: Tdap vaccine,  8yo or older,  IM   2. Essential hypertension  Comprehensive Metabolic Panel    HM1(CBC and Differential)    Lipid Cascade    HM1 (CBC with Diff)   3. Depression, unspecified depression type  Comprehensive Metabolic Panel    HM1(CBC and Differential)    Vitamin D, Total (25-Hydroxy)    HM1 (CBC with Diff)   4. Moderate recurrent major depression (H)  PARoxetine (PAXIL) 20 MG  tablet   5. Visit for screening mammogram  Mammo Screening Bilateral        PLAN:   1. Routine general medical examination at a health care facility    - Comprehensive Metabolic Panel  - HM1(CBC and Differential)  - Lipid Cascade  - Urinalysis-UC if Indicated  - HM1 (CBC with Diff)  - Gynecologic Cytology (PAP Smear)    2. Essential hypertension    - Comprehensive Metabolic Panel  - HM1(CBC and Differential)  - Lipid Cascade  - HM1 (CBC with Diff)    3. Depression, unspecified depression type    - Comprehensive Metabolic Panel  - HM1(CBC and Differential)  - Vitamin D, Total (25-Hydroxy)  - HM1 (CBC with Diff)    4. Moderate recurrent major depression (H)    - PARoxetine (PAXIL) 20 MG tablet; Take 1 tablet (20 mg total) by mouth at bedtime.  Dispense: 90 tablet; Refill: 3    5. Visit for screening mammogram    - Mammo Screening Bilateral; Future      I have had an Advance Directives discussion with the patient.        This note has been dictated using voice recognition software. Any grammatical or context distortions are unintentional and inherent to the the software.

## 2021-06-30 NOTE — PROGRESS NOTES
Progress Notes by Abeba English PA-C at 4/6/2021  6:25 PM     Author: Abeba English PA-C Service: -- Author Type: Physician Assistant    Filed: 4/6/2021  6:56 PM Encounter Date: 4/6/2021 Status: Signed    : Abeba English PA-C (Physician Assistant)       URGENT CARE VISIT:    SUBJECTIVE:   HPI:   Paty Addison is a 56 y.o. who presents with rash located over left back and abdomen since 3 day(s) ago. Rash seems to be worsened. She describes rash as Painful. Patient denies fever, difficulty breathing or throat/tongue swelling. Patient has tried massage with no relief of symptoms. Patient has not had contact exposures to new laundry detergents, soaps, lotions, or other potential irritants. Denies new foods or medications.  Patient admits to previous history of a similar rash. No one around them has had a similar rash.     PMH: History reviewed. No pertinent past medical history.  Allergies: Patient has no known allergies.   Medications:   Current Outpatient Medications   Medication Sig Dispense Refill   ? CA COMB NO.1/VIT D3/B-6/FA/B12 (VITAMIN D3, CALCIUM CIT-PHOS, ORAL) Take by mouth.     ? cholecalciferol, vitamin D3, 5,000 unit Tab Take by mouth.     ? ferrous sulfate 325 (65 FE) MG tablet Take 1 tablet by mouth daily with breakfast.     ? losartan (COZAAR) 25 MG tablet Take 1 tablet by mouth 2 (two) times a day.      ? PARoxetine (PAXIL) 20 MG tablet TAKE 1 TABLET(20 MG) BY MOUTH AT BEDTIME 90 tablet 0   ? predniSONE (DELTASONE) 20 MG tablet Take 40 mg by mouth daily for 5 days. 10 tablet 0   ? valACYclovir (VALTREX) 500 MG tablet Take 2 tablets (1,000 mg total) by mouth 2 (two) times a day for 7 days. 14 tablet 0     No current facility-administered medications for this visit.      Social History:   Social History     Socioeconomic History   ? Marital status:      Spouse name: Not on file   ? Number of children: Not on file   ? Years of education: Not on file   ? Highest education level: Not on  file   Occupational History   ? Not on file   Social Needs   ? Financial resource strain: Not on file   ? Food insecurity     Worry: Not on file     Inability: Not on file   ? Transportation needs     Medical: Not on file     Non-medical: Not on file   Tobacco Use   ? Smoking status: Never Smoker   ? Smokeless tobacco: Never Used   Substance and Sexual Activity   ? Alcohol use: Not on file   ? Drug use: Not on file   ? Sexual activity: Not on file   Lifestyle   ? Physical activity     Days per week: Not on file     Minutes per session: Not on file   ? Stress: Not on file   Relationships   ? Social connections     Talks on phone: Not on file     Gets together: Not on file     Attends [employer] service: Not on file     Active member of club or organization: Not on file     Attends meetings of clubs or organizations: Not on file     Relationship status: Not on file   ? Intimate partner violence     Fear of current or ex partner: Not on file     Emotionally abused: Not on file     Physically abused: Not on file     Forced sexual activity: Not on file   Other Topics Concern   ? Not on file   Social History Narrative   ? Not on file       ROS: ROS otherwise found to be negative except as noted above.    OBJECTIVE:  /73 (Patient Site: Right Arm, Patient Position: Sitting, Cuff Size: Adult Regular)   Pulse 63   Temp 98.2  F (36.8  C) (Oral)   Resp 16   Wt 150 lb 11.2 oz (68.4 kg)   SpO2 98%   BMI 23.25 kg/m    General: WDWN in NAD.   Eyes: Non-injected conjunctivas without drainage bilaterally.  Cardiac: RRR without murmurs, rubs, or gallops.  Respiratory: LCTAB without adventitious sounds. Non-labored breathing.  Integumentary: cluster of erythematous maculopapules following the left sided T6 dermatome  Neuro: Alert and oriented.     ASSESSMENT:   1. Herpes zoster without complication  valACYclovir (VALTREX) 500 MG tablet    predniSONE (DELTASONE) 20 MG tablet       PLAN:  Patient Instructions   Patient was  educated on the natural course of condition and potential complications (post-herpetic neuralgia). Take medication as directed. Side effects of prednisone may include anxiety, increased hunger, and insomnia. Valtrex dose adjusted for stage 3 kidney disease. To prevent the spread make sure you avoid direct contact with others. Keep rash covered if possible until rash has completely crusted over. Conservative measures discussed including over-the-counter analgesics (Tylenol) and lidocaine patches. See your primary care provider if symptoms worsen or do not improve in 7 days. Seek emergency care if you develop severe pain/redness, shortness of breath, or confusion.     Patient Education     Shingles  Shingles is a viral infection caused by the same virus as chicken pox. Anyone who has had chicken pox may get shingles later in life. The virus stays in the body, but remains dormant (asleep). Shingles often occurs in older persons or persons with lowered immunity. But it can affect anyone at any age.  Shingles starts as a tingling patch of skin on one side of the body. Small, painful blisters may then appear. The rash does not spread to the rest of the body.  Exposure to shingles cannot cause shingles. However, it can cause chicken pox in anyone who has not had chicken pox or has not been vaccinated. The contagious period ends when all blisters have crusted over (generally about 2 weeks after the illness begins).  After the blisters heal, the affected skin may be sensitive or painful for months (neuralgia). This often gradually goes away.  A shingles vaccine is available. This can help prevent shingles or make it less painful. It is generally recommended for adults over the age of 60 who have had chicken pox in the past, but who have never had shingles. Adults over 60 who have had neither chicken pox nor shingles can prevent both diseases with the chicken pox vaccine. Ask your healthcare provider about these  vaccines.  Home care    Medicines may be prescribed to help relieve pain. Take these medicines as directed. Ask your healthcare provider or pharmacist before using over-the-counter medicines for helping treat pain and itching.    In certain cases, antiviral medicines may be prescribed to reduce pain, shorten the illness, and prevent neuralgia. Take these medicines as directed.    Compresses made from a solution of cool water mixed with cornstarch or baking soda may help relieve pain and itching.     Gently wash skin daily with soap and water to help prevent infection.  Be certain to rinse off all of the soap, which can be irritating.    Trim fingernails and try not to scratch. Scratching the sores may leave scars.    Stay home from work or school until all blisters have formed a crust and you are no longer contagious.  Follow-up care  Follow up with your healthcare provider or as directed by our staff.  When to seek medical advice    Fever of 100.4 F (38 C) or higher, or as directed by your healthcare provider    Affected skin is on the face or neck and any of the following occur:  ? Headache  ? Eye pain  ? Changes in vision  ? Sores near the eye  ? Weakness of facial muscles    Pain, redness, or swelling of a joint    Signs of skin infection: colored drainage from the sores, warmth, increasing redness, or increasing pain  Date Last Reviewed: 9/25/2015 2000-2017 [employer]. [address] [place] [address], [place] [address]. All rights reserved. This information is not intended as a substitute for professional medical care. Always follow your healthcare professional's instructions.           Patient verbalized understanding and is agreeable to plan. The patient was discharged ambulatory and in stable condition.

## 2021-07-15 DIAGNOSIS — F33.1 MODERATE RECURRENT MAJOR DEPRESSION (H): ICD-10-CM

## 2021-07-19 NOTE — TELEPHONE ENCOUNTER
"Routing refill request to provider for review/approval because:  Labs not current:  PHQ score  Visit greater than one year ago    Last Written Prescription Date:  4/18/21  Last Fill Quantity: 90,  # refills: 0   Last office visit provider:  2/21/19     Requested Prescriptions   Pending Prescriptions Disp Refills     PARoxetine (PAXIL) 20 MG tablet [Pharmacy Med Name: PAROXETINE 20MG TABLETS] 90 tablet 0     Sig: TAKE 1 TABLET(20 MG) BY MOUTH AT BEDTIME       SSRIs Protocol Failed - 7/15/2021  5:24 AM        Failed - PHQ-9 score less than 5 in past 6 months     Please review last PHQ-9 score.           Failed - Recent (6 mo) or future (30 days) visit within the authorizing provider's specialty     Patient had office visit in the last 6 months or has a visit in the next 30 days with authorizing provider or within the authorizing provider's specialty.  See \"Patient Info\" tab in inbasket, or \"Choose Columns\" in Meds & Orders section of the refill encounter.            Passed - Medication is active on med list        Passed - Patient is age 18 or older        Passed - No active pregnancy on record        Passed - No positive pregnancy test in last 12 months             Vinny Quinones RN 07/19/21 12:35 PM  "

## 2021-07-24 RX ORDER — PAROXETINE 20 MG/1
TABLET, FILM COATED ORAL
Qty: 90 TABLET | Refills: 0 | Status: SHIPPED | OUTPATIENT
Start: 2021-07-24 | End: 2021-10-14

## 2021-09-19 ENCOUNTER — HEALTH MAINTENANCE LETTER (OUTPATIENT)
Age: 56
End: 2021-09-19

## 2021-10-04 ENCOUNTER — ANCILLARY PROCEDURE (OUTPATIENT)
Dept: MAMMOGRAPHY | Facility: CLINIC | Age: 56
End: 2021-10-04
Attending: FAMILY MEDICINE
Payer: COMMERCIAL

## 2021-10-04 DIAGNOSIS — Z12.31 VISIT FOR SCREENING MAMMOGRAM: ICD-10-CM

## 2021-10-04 PROCEDURE — 77063 BREAST TOMOSYNTHESIS BI: CPT

## 2021-10-13 DIAGNOSIS — F33.1 MODERATE RECURRENT MAJOR DEPRESSION (H): ICD-10-CM

## 2021-10-13 NOTE — TELEPHONE ENCOUNTER
"Routing refill request to provider for review/approval because:  Failed selective serotonin reuptake inhibitor protocol  No recent (6 mo) visit    Last Written Prescription Date:  7/24/2021  Last Fill Quantity: 90,  # refills: 0   Last office visit provider:  2/21/2019 Dr. López     Requested Prescriptions   Pending Prescriptions Disp Refills     PARoxetine (PAXIL) 20 MG tablet [Pharmacy Med Name: PAROXETINE 20MG TABLETS] 90 tablet 0     Sig: TAKE 1 TABLET(20 MG) BY MOUTH AT BEDTIME       SSRIs Protocol Failed - 10/13/2021  5:23 AM        Failed - PHQ-9 score less than 5 in past 6 months     Please review last PHQ-9 score.           Failed - Recent (6 mo) or future (30 days) visit within the authorizing provider's specialty     Patient had office visit in the last 6 months or has a visit in the next 30 days with authorizing provider or within the authorizing provider's specialty.  See \"Patient Info\" tab in inbasket, or \"Choose Columns\" in Meds & Orders section of the refill encounter.            Passed - Medication is active on med list        Passed - Patient is age 18 or older        Passed - No active pregnancy on record        Passed - No positive pregnancy test in last 12 months             Barbi Calderon RN 10/13/21 5:36 PM  "

## 2021-10-14 RX ORDER — PAROXETINE 20 MG/1
TABLET, FILM COATED ORAL
Qty: 90 TABLET | Refills: 0 | Status: SHIPPED | OUTPATIENT
Start: 2021-10-14 | End: 2022-01-13

## 2021-11-05 ENCOUNTER — IMMUNIZATION (OUTPATIENT)
Dept: NURSING | Facility: CLINIC | Age: 56
End: 2021-11-05
Payer: COMMERCIAL

## 2021-11-05 PROCEDURE — 0011A PR COVID VAC MODERNA 100 MCG/0.5 ML IM: CPT

## 2021-11-05 PROCEDURE — 91301 PR COVID VAC MODERNA 100 MCG/0.5 ML IM: CPT

## 2021-12-03 ENCOUNTER — IMMUNIZATION (OUTPATIENT)
Dept: NURSING | Facility: CLINIC | Age: 56
End: 2021-12-03
Attending: PEDIATRICS
Payer: COMMERCIAL

## 2021-12-03 PROCEDURE — 91301 PR COVID VAC MODERNA 100 MCG/0.5 ML IM: CPT

## 2021-12-03 PROCEDURE — 0012A PR COVID VAC MODERNA 100 MCG/0.5 ML IM: CPT

## 2022-01-11 DIAGNOSIS — F33.1 MODERATE RECURRENT MAJOR DEPRESSION (H): ICD-10-CM

## 2022-01-13 RX ORDER — PAROXETINE 20 MG/1
TABLET, FILM COATED ORAL
Qty: 90 TABLET | Refills: 0 | Status: SHIPPED | OUTPATIENT
Start: 2022-01-13 | End: 2022-04-20

## 2022-01-13 NOTE — TELEPHONE ENCOUNTER
"Routing refill request to provider for review/approval because:  PHQ 9 score - not on file/out of date.  Patient needs to be seen because it has been more than 1 year since last office visit.    Last Written Prescription Date:  10/14/21  Last Fill Quantity: 90,  # refills: 0   Last office visit provider:  2/21/2019     Requested Prescriptions   Pending Prescriptions Disp Refills     PARoxetine (PAXIL) 20 MG tablet [Pharmacy Med Name: PAROXETINE 20MG TABLETS] 90 tablet 0     Sig: TAKE 1 TABLET(20 MG) BY MOUTH AT BEDTIME       SSRIs Protocol Failed - 1/11/2022  5:23 AM        Failed - PHQ-9 score less than 5 in past 6 months     Please review last PHQ-9 score.           Failed - Recent (6 mo) or future (30 days) visit within the authorizing provider's specialty     Patient had office visit in the last 6 months or has a visit in the next 30 days with authorizing provider or within the authorizing provider's specialty.  See \"Patient Info\" tab in inbasket, or \"Choose Columns\" in Meds & Orders section of the refill encounter.            Passed - Medication is active on med list        Passed - Patient is age 18 or older        Passed - No active pregnancy on record        Passed - No positive pregnancy test in last 12 months             Vinny Quinones RN 01/13/22 1:46 PM  "

## 2022-04-18 DIAGNOSIS — F33.1 MODERATE RECURRENT MAJOR DEPRESSION (H): ICD-10-CM

## 2022-04-19 NOTE — TELEPHONE ENCOUNTER
"Routing refill request to provider for review/approval because:  Patient needs to be seen because it has been more than 1 year since last office visit.  Review PHQ-9 score    Last Written Prescription Date:  1/13/2022  Last Fill Quantity: 90,  # refills: 0   Last office visit provider:  2/21/2019     Requested Prescriptions   Pending Prescriptions Disp Refills     PARoxetine (PAXIL) 20 MG tablet [Pharmacy Med Name: PAROXETINE 20MG TABLETS] 90 tablet 0     Sig: TAKE 1 TABLET(20 MG) BY MOUTH AT BEDTIME       SSRIs Protocol Failed - 4/18/2022  5:20 AM        Failed - PHQ-9 score less than 5 in past 6 months     Please review last PHQ-9 score.           Failed - Recent (6 mo) or future (30 days) visit within the authorizing provider's specialty     Patient had office visit in the last 6 months or has a visit in the next 30 days with authorizing provider or within the authorizing provider's specialty.  See \"Patient Info\" tab in inbasket, or \"Choose Columns\" in Meds & Orders section of the refill encounter.            Passed - Medication is active on med list        Passed - Patient is age 18 or older        Passed - No active pregnancy on record        Passed - No positive pregnancy test in last 12 months             Elke Perales RN 04/19/22 1:44 PM  "

## 2022-04-20 RX ORDER — PAROXETINE 20 MG/1
TABLET, FILM COATED ORAL
Qty: 90 TABLET | Refills: 0 | Status: SHIPPED | OUTPATIENT
Start: 2022-04-20 | End: 2022-07-14

## 2022-05-01 ENCOUNTER — HEALTH MAINTENANCE LETTER (OUTPATIENT)
Age: 57
End: 2022-05-01

## 2022-06-26 ENCOUNTER — HEALTH MAINTENANCE LETTER (OUTPATIENT)
Age: 57
End: 2022-06-26

## 2022-07-14 ENCOUNTER — OFFICE VISIT (OUTPATIENT)
Dept: FAMILY MEDICINE | Facility: CLINIC | Age: 57
End: 2022-07-14
Payer: COMMERCIAL

## 2022-07-14 VITALS
HEIGHT: 68 IN | DIASTOLIC BLOOD PRESSURE: 76 MMHG | BODY MASS INDEX: 23.79 KG/M2 | HEART RATE: 56 BPM | SYSTOLIC BLOOD PRESSURE: 128 MMHG | RESPIRATION RATE: 18 BRPM | OXYGEN SATURATION: 98 % | WEIGHT: 157 LBS

## 2022-07-14 DIAGNOSIS — F33.1 MODERATE RECURRENT MAJOR DEPRESSION (H): ICD-10-CM

## 2022-07-14 DIAGNOSIS — Z00.00 ROUTINE GENERAL MEDICAL EXAMINATION AT A HEALTH CARE FACILITY: Primary | ICD-10-CM

## 2022-07-14 DIAGNOSIS — Q61.3 POLYCYSTIC KIDNEY DISEASE: ICD-10-CM

## 2022-07-14 DIAGNOSIS — I10 ESSENTIAL HYPERTENSION, BENIGN: ICD-10-CM

## 2022-07-14 PROBLEM — R60.9 EDEMA: Status: ACTIVE | Noted: 2020-02-07

## 2022-07-14 PROBLEM — N18.4 STAGE 4 CHRONIC KIDNEY DISEASE (H): Status: ACTIVE | Noted: 2020-02-07

## 2022-07-14 PROBLEM — N25.81 SECONDARY HYPERPARATHYROIDISM OF RENAL ORIGIN (H): Status: ACTIVE | Noted: 2020-02-07

## 2022-07-14 PROBLEM — Q61.2 AUTOSOMAL DOMINANT POLYCYSTIC KIDNEY DISEASE: Status: ACTIVE | Noted: 2020-02-07

## 2022-07-14 LAB
ALBUMIN UR-MCNC: NEGATIVE MG/DL
ANION GAP SERPL CALCULATED.3IONS-SCNC: 12 MMOL/L (ref 7–15)
APPEARANCE UR: CLEAR
BILIRUB UR QL STRIP: NEGATIVE
BUN SERPL-MCNC: 43.8 MG/DL (ref 6–20)
CALCIUM SERPL-MCNC: 9.9 MG/DL (ref 8.6–10)
CHLORIDE SERPL-SCNC: 105 MMOL/L (ref 98–107)
CHOLEST SERPL-MCNC: 232 MG/DL
COLOR UR AUTO: YELLOW
CREAT SERPL-MCNC: 2.15 MG/DL (ref 0.51–0.95)
CREAT UR-MCNC: 45.2 MG/DL
DEPRECATED HCO3 PLAS-SCNC: 23 MMOL/L (ref 22–29)
ERYTHROCYTE [DISTWIDTH] IN BLOOD BY AUTOMATED COUNT: 12.7 % (ref 10–15)
GFR SERPL CREATININE-BSD FRML MDRD: 26 ML/MIN/1.73M2
GLUCOSE SERPL-MCNC: 90 MG/DL (ref 70–99)
GLUCOSE UR STRIP-MCNC: NEGATIVE MG/DL
HCT VFR BLD AUTO: 33.7 % (ref 35–47)
HDLC SERPL-MCNC: 72 MG/DL
HGB BLD-MCNC: 11.5 G/DL (ref 11.7–15.7)
HGB UR QL STRIP: NEGATIVE
KETONES UR STRIP-MCNC: NEGATIVE MG/DL
LDLC SERPL CALC-MCNC: 143 MG/DL
LEUKOCYTE ESTERASE UR QL STRIP: NEGATIVE
MCH RBC QN AUTO: 30.6 PG (ref 26.5–33)
MCHC RBC AUTO-ENTMCNC: 34.1 G/DL (ref 31.5–36.5)
MCV RBC AUTO: 90 FL (ref 78–100)
MICROALBUMIN UR-MCNC: 17.5 MG/L
MICROALBUMIN/CREAT UR: 38.72 MG/G CR (ref 0–25)
NITRATE UR QL: NEGATIVE
NONHDLC SERPL-MCNC: 160 MG/DL
PH UR STRIP: 5.5 [PH] (ref 5–8)
PLATELET # BLD AUTO: 250 10E3/UL (ref 150–450)
POTASSIUM SERPL-SCNC: 4.7 MMOL/L (ref 3.4–5.3)
PTH-INTACT SERPL-MCNC: 36 PG/ML (ref 15–65)
RBC # BLD AUTO: 3.76 10E6/UL (ref 3.8–5.2)
SODIUM SERPL-SCNC: 140 MMOL/L (ref 136–145)
SP GR UR STRIP: <=1.005 (ref 1–1.03)
TRIGL SERPL-MCNC: 83 MG/DL
UROBILINOGEN UR STRIP-ACNC: 0.2 E.U./DL
WBC # BLD AUTO: 4.5 10E3/UL (ref 4–11)

## 2022-07-14 PROCEDURE — 85027 COMPLETE CBC AUTOMATED: CPT | Performed by: FAMILY MEDICINE

## 2022-07-14 PROCEDURE — 83970 ASSAY OF PARATHORMONE: CPT | Performed by: FAMILY MEDICINE

## 2022-07-14 PROCEDURE — 36415 COLL VENOUS BLD VENIPUNCTURE: CPT | Performed by: FAMILY MEDICINE

## 2022-07-14 PROCEDURE — 82043 UR ALBUMIN QUANTITATIVE: CPT | Performed by: FAMILY MEDICINE

## 2022-07-14 PROCEDURE — 80048 BASIC METABOLIC PNL TOTAL CA: CPT | Performed by: FAMILY MEDICINE

## 2022-07-14 PROCEDURE — 82306 VITAMIN D 25 HYDROXY: CPT | Performed by: FAMILY MEDICINE

## 2022-07-14 PROCEDURE — 99386 PREV VISIT NEW AGE 40-64: CPT | Performed by: FAMILY MEDICINE

## 2022-07-14 PROCEDURE — 81003 URINALYSIS AUTO W/O SCOPE: CPT | Performed by: FAMILY MEDICINE

## 2022-07-14 PROCEDURE — 99213 OFFICE O/P EST LOW 20 MIN: CPT | Mod: 25 | Performed by: FAMILY MEDICINE

## 2022-07-14 PROCEDURE — 80061 LIPID PANEL: CPT | Performed by: FAMILY MEDICINE

## 2022-07-14 RX ORDER — GABAPENTIN 300 MG/1
300 CAPSULE ORAL
COMMUNITY
Start: 2021-04-07 | End: 2022-07-14

## 2022-07-14 RX ORDER — LOSARTAN POTASSIUM 50 MG/1
TABLET ORAL
COMMUNITY
Start: 2022-05-27 | End: 2022-07-14 | Stop reason: DRUGHIGH

## 2022-07-14 RX ORDER — PAROXETINE 20 MG/1
20 TABLET, FILM COATED ORAL AT BEDTIME
Qty: 90 TABLET | Refills: 3 | Status: SHIPPED | OUTPATIENT
Start: 2022-07-14 | End: 2022-07-17

## 2022-07-14 ASSESSMENT — ENCOUNTER SYMPTOMS
ARTHRALGIAS: 0
HEMATURIA: 0
HEMATOCHEZIA: 0
JOINT SWELLING: 0
ABDOMINAL PAIN: 0
CONSTIPATION: 0
NERVOUS/ANXIOUS: 0
SHORTNESS OF BREATH: 0
BREAST MASS: 0
PALPITATIONS: 0
DIZZINESS: 0
CHILLS: 0
HEADACHES: 0
COUGH: 0
SORE THROAT: 0
NAUSEA: 0
MYALGIAS: 0
EYE PAIN: 0
DIARRHEA: 0
FEVER: 0
HEARTBURN: 0
DYSURIA: 0
WEAKNESS: 0
PARESTHESIAS: 0
FREQUENCY: 0

## 2022-07-14 ASSESSMENT — PATIENT HEALTH QUESTIONNAIRE - PHQ9
SUM OF ALL RESPONSES TO PHQ QUESTIONS 1-9: 0
SUM OF ALL RESPONSES TO PHQ QUESTIONS 1-9: 0

## 2022-07-14 ASSESSMENT — PAIN SCALES - GENERAL: PAINLEVEL: NO PAIN (0)

## 2022-07-14 NOTE — PROGRESS NOTES
SUBJECTIVE:   CC: Paty Addison is an 57 year old woman who presents for preventive health visit.       Patient has been advised of split billing requirements and indicates understanding: Yes    Patient states she is getting leg and muscle cramps almost on a daily basis other than that she feels well    Today's PHQ-2 Score:   PHQ-2 ( 1999 [employer]) 7/14/2022   Q1: Little interest or pleasure in doing things 0   Q2: Feeling down, depressed or hopeless 0   PHQ-2 Score 0   Q1: Little interest or pleasure in doing things Not at all   Q2: Feeling down, depressed or hopeless Not at all   PHQ-2 Score 0       Abuse: Current or Past (Physical, Sexual or Emotional) - No  Do you feel safe in your environment? Yes        Social History     Tobacco Use     Smoking status: Never Smoker     Smokeless tobacco: Never Used   Substance Use Topics     Alcohol use: Not on file     If you drink alcohol do you typically have >3 drinks per day or >7 drinks per week? No    Alcohol Use 7/14/2022   Prescreen: >3 drinks/day or >7 drinks/week? No   No flowsheet data found.    Reviewed orders with patient.  Reviewed health maintenance and updated orders accordingly - Yes  Lab work is in process    Breast Cancer Screening:    Breast CA Risk Assessment (FHS-7) 7/14/2022   Do you have a family history of breast, colon, or ovarian cancer? No / Unknown         Mammogram Screening: Recommended mammography every 1-2 years with patient discussion and risk factor consideration  Pertinent mammograms are reviewed under the imaging tab.    History of abnormal Pap smear: NO - age 30-65 PAP every 5 years with negative HPV co-testing recommended  PAP / HPV Latest Ref Rng & Units 2/21/2019   PAP Negative for squamous intraepithelial lesion or malignancy. Negative for squamous intraepithelial lesion or malignancy  Electronically signed by Cas Nava CT (ASCP) on 2/25/2019 at  2:00 PM       Reviewed and updated as needed this visit by clinical  "staff   Tobacco  Allergies  Meds   Med Hx  Surg Hx  Fam Hx            Reviewed and updated as needed this visit by Provider                       Review of Systems  CONSTITUTIONAL: NEGATIVE for fever, chills, change in weight  INTEGUMENTARY/SKIN: NEGATIVE for worrisome rashes, moles or lesions  EYES: NEGATIVE for vision changes or irritation  ENT: NEGATIVE for ear, mouth and throat problems  RESP: NEGATIVE for significant cough or SOB  BREAST: NEGATIVE for masses, tenderness or discharge  CV: NEGATIVE for chest pain, palpitations or peripheral edema  GI: NEGATIVE for nausea, abdominal pain, heartburn, or change in bowel habits  : NEGATIVE for unusual urinary or vaginal symptoms. No vaginal bleeding.  MUSCULOSKELETAL: NEGATIVE for significant arthralgias or myalgia  NEURO: NEGATIVE for weakness, dizziness or paresthesias  PSYCHIATRIC: NEGATIVE for changes in mood or affect      OBJECTIVE:   Resp 18   Ht 1.715 m (5' 7.5\")   Wt 71.2 kg (157 lb)   BMI 24.23 kg/m    Physical Exam  General Appearance:  Alert, cooperative, no distress  Head:  Normocephalic, no obvious abnormality  Ears: TM anatomy normal  Eyes:  PERRL, EOM's intact, conjunctiva and corneas clear  Nose:  Nares symmetrical, septum midline, mucosa pink, no sinus tenderness  Throat:  Lips, tongue, and mucosa are moist, pink, and intact  Neck:  Supple, symmetrical, trachea midline, no adenopathy; thyroid: no enlargement, symmetric,no tenderness/mass/nodules; no carotid bruit, no JVD  Back:  Symmetrical, no curvature, ROM normal, no CVA tenderness  Chest/Breast:  No mass or tenderness  Lungs:  Clear to auscultation bilaterally, respirations unlabored   Heart:  Normal PMI, regular rate & rhythm, S1 and S2 normal, no murmurs, rubs, or gallops  Abdomen:  Soft, non-tender, bowel sounds active all four quadrants, no mass, or organomegaly  Musculoskeletal:  Tone and strength strong and symmetrical, all extremities  Lymphatic:  No " adenopathy  Skin/Hair/Nails:  Skin warm, dry, and intact, no rashes  Neurologic:  Alert and oriented x3, no cranial nerve deficits, normal strength and tone, gait steady  Extremities:  No edema.  Chanel's sign negative.    Genitourinary: deferred  Pulses:  Equal bilaterally     Diagnostic Test Results:  Labs reviewed in Epic    ASSESSMENT/PLAN:       ICD-10-CM    1. Routine general medical examination at a health care facility  Z00.00 CBC with platelets     Basic metabolic panel  (Ca, Cl, CO2, Creat, Gluc, K, Na, BUN)     Lipid panel reflex to direct LDL Fasting     UA Macro with Reflex to Micro and Culture - lab collect     Albumin Random Urine Quantitative with Creat Ratio     Parathyroid Hormone Intact     REVIEW OF HEALTH MAINTENANCE PROTOCOL ORDERS     Hemoglobin     Vitamin D Deficiency     PARoxetine (PAXIL) 20 MG tablet   2. Moderate recurrent major depression (H)  F33.1 PARoxetine (PAXIL) 20 MG tablet   3. Polycystic kidney disease  Q61.3    4. Benign Essential Hypertension  I10      History of present illness: Paty has been under my care since she was a young woman and first .  She reports a good year of health.  We have not done a complete examination in some years.  Due to COVID.  She does have a history of polycystic kidney disease she is under the care of nephrology.  She is being considered for transplant somewhere down the road her BUN creatinine and GFR are being monitored on an annual basis.  Her GFR currently is in the 20-23 range.  Transplant will be considered and she will be transferred to the care of [facility] for pretransplant evaluation when her GFR drops down into the 10-15 range.  Her energy level is good.  She reports she is getting some muscle cramps almost on a daily basis and has consulted with her kidney specialist but no etiology has been determined.  She does not have parathyroid disease.  We did suggest the intake of fresh green string beans 2-3 times per week and she may  "consider taking some magnesium oxide 250 mg once daily to aid with muscle cramps.  She can try the magnesium oxide for a month if this alleviates the problem she may continue to take it and get a magnesium level on an annual basis.  Family life is stable.  She works for [employer].  Her depression is well controlled with paroxetine.  All medical questions asked were answered I personally reviewed family social history surgeries allergies problems list  Patient has been advised of split billing requirements and indicates understanding: Yes    COUNSELING:      Estimated body mass index is 24.23 kg/m  as calculated from the following:    Height as of this encounter: 1.715 m (5' 7.5\").    Weight as of this encounter: 71.2 kg (157 lb).        She reports that she has never smoked. She has never used smokeless tobacco.      Counseling Resources:  ATP IV Guidelines  Pooled Cohorts Equation Calculator  Breast Cancer Risk Calculator  BRCA-Related Cancer Risk Assessment: FHS-7 Tool  FRAX Risk Assessment  ICSI Preventive Guidelines  Dietary Guidelines for Americans, 2010  [employer]'s MyPlate  ASA Prophylaxis  Lung CA Screening    Dwayne López MD  [facility]  Answers for HPI/ROS submitted by the patient on 7/14/2022  PHQ9 TOTAL SCORE: 0  Frequency of exercise:: 6-7 days/week  Getting at least 3 servings of Calcium per day:: Yes  Diet:: Low salt  Taking medications regularly:: Yes  Medication side effects:: None  Bi-annual eye exam:: Yes  Dental care twice a year:: Yes  Sleep apnea or symptoms of sleep apnea:: None  Additional concerns today:: No  Duration of exercise:: 45-60 minutes      "

## 2022-07-15 LAB — DEPRECATED CALCIDIOL+CALCIFEROL SERPL-MC: 84 UG/L (ref 20–75)

## 2022-07-17 DIAGNOSIS — F33.1 MODERATE RECURRENT MAJOR DEPRESSION (H): ICD-10-CM

## 2022-07-17 DIAGNOSIS — Z00.00 ROUTINE GENERAL MEDICAL EXAMINATION AT A HEALTH CARE FACILITY: ICD-10-CM

## 2022-07-17 RX ORDER — PAROXETINE 20 MG/1
TABLET, FILM COATED ORAL
Qty: 90 TABLET | Refills: 3 | Status: SHIPPED | OUTPATIENT
Start: 2022-07-17 | End: 2023-07-13

## 2022-07-17 NOTE — TELEPHONE ENCOUNTER
"Last Written Prescription Date:  7/14/22  Last Fill Quantity: 90,  # refills: 3   Last office visit provider:  7/14/22     Requested Prescriptions   Pending Prescriptions Disp Refills     PARoxetine (PAXIL) 20 MG tablet [Pharmacy Med Name: PAROXETINE 20MG TABLETS] 90 tablet 3     Sig: TAKE 1 TABLET(20 MG) BY MOUTH AT BEDTIME       SSRIs Protocol Passed - 7/17/2022  5:25 AM        Passed - PHQ-9 score less than 5 in past 6 months     Please review last PHQ-9 score.           Passed - Medication is active on med list        Passed - Patient is age 18 or older        Passed - No active pregnancy on record        Passed - No positive pregnancy test in last 12 months        Passed - Recent (6 mo) or future (30 days) visit within the authorizing provider's specialty     Patient had office visit in the last 6 months or has a visit in the next 30 days with authorizing provider or within the authorizing provider's specialty.  See \"Patient Info\" tab in inbasket, or \"Choose Columns\" in Meds & Orders section of the refill encounter.                 Marla Perkins 07/17/22 4:42 PM  "

## 2022-11-20 ENCOUNTER — HEALTH MAINTENANCE LETTER (OUTPATIENT)
Age: 57
End: 2022-11-20

## 2023-01-16 ENCOUNTER — TRANSFERRED RECORDS (OUTPATIENT)
Dept: HEALTH INFORMATION MANAGEMENT | Facility: CLINIC | Age: 58
End: 2023-01-16

## 2023-01-18 ENCOUNTER — VIRTUAL VISIT (OUTPATIENT)
Dept: FAMILY MEDICINE | Facility: CLINIC | Age: 58
End: 2023-01-18
Payer: COMMERCIAL

## 2023-01-18 ENCOUNTER — NURSE TRIAGE (OUTPATIENT)
Dept: NURSING | Facility: CLINIC | Age: 58
End: 2023-01-18
Payer: COMMERCIAL

## 2023-01-18 DIAGNOSIS — R09.81 CONGESTION OF PARANASAL SINUS: Primary | ICD-10-CM

## 2023-01-18 DIAGNOSIS — J06.9 UPPER RESPIRATORY TRACT INFECTION, UNSPECIFIED TYPE: ICD-10-CM

## 2023-01-18 PROCEDURE — 99213 OFFICE O/P EST LOW 20 MIN: CPT | Mod: GT | Performed by: FAMILY MEDICINE

## 2023-01-18 NOTE — TELEPHONE ENCOUNTER
Pt is phoning stating that she feels like she has a sinus infection     Yellow/ brownish sinus drainage, teeth pain, headache    No fever now, but states that she was running a fever of 101.0 last night     Rates sinus pain 6/10    Per disposition: See in Office Today    Transferred to scheduling     Care advice given per protocol and when to call back. Pt verbalized understanding and agrees to plan of care.    Matilda Mckay RN  [facility] Nurse Advisor  9:14 AM 1/18/2023          Additional Information    Negative: Difficulty breathing, and not from stuffy nose (e.g., not relieved by cleaning out the nose)    Negative: Sounds like a life-threatening emergency to the triager    Negative: SEVERE headache and has fever    Negative: Patient sounds very sick or weak to the triager    Negative: SEVERE sinus pain    Negative: Severe headache    Negative: Redness or swelling on the cheek, forehead, or around the eye    Negative: Fever > 103 F (39.4 C)    Negative: Fever > 101 F (38.3 C) and over 60 years of age    Negative: Fever > 100.0 F (37.8 C) and has diabetes mellitus or a weak immune system (e.g., HIV positive, cancer chemotherapy, organ transplant, splenectomy, chronic steroids)    Negative: Fever > 100.0 F (37.8 C) and bedridden (e.g., nursing home patient, stroke, chronic illness, recovering from surgery)    Negative: Fever present > 3 days (72 hours)    Negative: Fever returns after gone for over 24 hours and symptoms worse or not improved    Sinus pain (not just congestion) and fever    Protocols used: SINUS PAIN AND CONGESTION-A-[place]

## 2023-01-18 NOTE — PATIENT INSTRUCTIONS
Mucinex (Guaifenesin) 600 mg twice a day - take for 5-7 days        NON PRESCRIPTION TREATMENT    Increase humidity to 30-40% in bedroom at night - vaporizer  Avoid decongestant  Saline nasal spray as needed  Increase fluid intake  Benadryl 25mg 1/2 - 1 hour before bed time  Maintain 8 hr minimum of sleep at night  Robitussin DM cough gels for cough    I would recommend you complete a home COVID test.  If this testing is positive, please reach out again for appointment to discuss COVID treatment options.

## 2023-01-18 NOTE — PROGRESS NOTES
Paty is a 57 year old who is being evaluated via a billable video visit.      How would you like to obtain your AVS? MyChart  If the video visit is dropped, the invitation should be resent by: Send to e-mail at: [email or web address]  Will anyone else be joining your video visit? No        Assessment & Plan     Congestion of paranasal sinus  Upper respiratory tract infection, unspecified type  Onset of symptoms within the last 24 hours.  We discussed that initial symptoms related to the sinuses are generally viral.  If symptoms persist for 7 to 10 days then it is more likely a bacterial infection may be present.  Symptomatic care recommended to include the Tylenol she is already taking and Mucinex for mucolytic effects.  Humidification of the air at home and maintaining good hydration recommended.  There is certainly a chance this could be COVID and therefore she is recommended to do a home COVID test which she has access to.  She will reach out again for additional appointment if COVID testing is positive to discuss treatment options.             Patient Instructions   Mucinex (Guaifenesin) 600 mg twice a day - take for 5-7 days        NON PRESCRIPTION TREATMENT    Increase humidity to 30-40% in bedroom at night - vaporizer  Avoid decongestant  Saline nasal spray as needed  Increase fluid intake  Benadryl 25mg 1/2 - 1 hour before bed time  Maintain 8 hr minimum of sleep at night  Robitussin DM cough gels for cough    I would recommend you complete a home COVID test.  If this testing is positive, please reach out again for appointment to discuss COVID treatment options.      Return in about 2 weeks (around 2/1/2023) for as needed for persistent symptoms.    Navya Huynh MD  [facility]   Paty is a 57 year old, presenting for the following health issues:  URI      HPI     Acute Illness  Acute illness concerns: Sinus infection  Onset/Duration: 1/18/2023  Symptoms:  Fever:  " @ 7:30pm last night 1/17/2023, normal @ 7:30am Today  Chills/Sweats: No  Headache (location?): YES, took tylenol-was effective  Sinus Pressure: YES  Conjunctivitis:  No  Ear Pain: YES: left-feels clogged  Rhinorrhea: No  Congestion: YES - in head  Sore Throat: YES, only this morning -resolved now.    Cough: YES-productive of yellow sputum, productive of green sputum  Wheeze: No  Decreased Appetite: YES  Nausea: No  Vomiting: No  Diarrhea: No  Dysuria/Freq.: No  Dysuria or Hematuria: No  Fatigue/Achiness: YES  Sick/Strep Exposure: No  Therapies tried and outcome: Tylenol    Chronic kidney disease.    Review of Systems   Constitutional, HEENT, cardiovascular, pulmonary, gi and gu systems are negative, except as otherwise noted.      Objective    Vitals - Patient Reported  Weight (Patient Reported): 71.2 kg (157 lb)  Height (Patient Reported): 172.7 cm (5' 8\")  BMI (Based on Pt Reported Ht/Wt): 23.87  Pain Score: Extreme Pain (8)  Pain Loc: Head        Physical Exam   GENERAL: alert, no distress and fatigued  EYES: Eyes grossly normal to inspection.  No discharge or erythema, or obvious scleral/conjunctival abnormalities.  RESP: No audible wheeze, cough, or visible cyanosis.  No visible retractions or increased work of breathing.    SKIN: Visible skin clear. No significant rash, abnormal pigmentation or lesions.  NEURO: Cranial nerves grossly intact.  Mentation and speech appropriate for age.  PSYCH: Mentation appears normal, affect normal/bright, judgement and insight intact, normal speech and appearance well-groomed.                Video-Visit Details    Type of service:  Video Visit   Time 14 min  Originating Location (pt. Location): Home  Distant Location (provider location):  Off-site  Platform used for Video Visit: Laly    "

## 2023-02-16 ENCOUNTER — TRANSFERRED RECORDS (OUTPATIENT)
Dept: HEALTH INFORMATION MANAGEMENT | Facility: CLINIC | Age: 58
End: 2023-02-16

## 2023-06-14 ENCOUNTER — PATIENT OUTREACH (OUTPATIENT)
Dept: CARE COORDINATION | Facility: CLINIC | Age: 58
End: 2023-06-14
Payer: COMMERCIAL

## 2023-06-28 ENCOUNTER — PATIENT OUTREACH (OUTPATIENT)
Dept: CARE COORDINATION | Facility: CLINIC | Age: 58
End: 2023-06-28
Payer: COMMERCIAL

## 2023-07-12 DIAGNOSIS — F33.1 MODERATE RECURRENT MAJOR DEPRESSION (H): ICD-10-CM

## 2023-07-12 DIAGNOSIS — Z00.00 ROUTINE GENERAL MEDICAL EXAMINATION AT A HEALTH CARE FACILITY: ICD-10-CM

## 2023-07-13 RX ORDER — PAROXETINE 20 MG/1
TABLET, FILM COATED ORAL
Qty: 90 TABLET | Refills: 3 | Status: SHIPPED | OUTPATIENT
Start: 2023-07-13

## 2023-07-13 NOTE — TELEPHONE ENCOUNTER
"Routing refill request to provider for review/approval because:  Patient needs to be seen because it has been more than 6 months since last office visit.  PHQ9 out of date    Last Written Prescription Date:  7/17/22  Last Fill Quantity: 90,  # refills: 3   Last office visit provider:  7/14/22     Requested Prescriptions   Pending Prescriptions Disp Refills    PARoxetine (PAXIL) 20 MG tablet [Pharmacy Med Name: PAROXETINE 20MG TABLETS] 90 tablet 3     Sig: TAKE 1 TABLET(20 MG) BY MOUTH AT BEDTIME       SSRIs Protocol Failed - 7/12/2023  3:17 PM        Failed - PHQ-9 score less than 5 in past 6 months     Please review last PHQ-9 score.           Failed - Recent (6 mo) or future (30 days) visit within the authorizing provider's specialty     Patient had office visit in the last 6 months or has a visit in the next 30 days with authorizing provider or within the authorizing provider's specialty.  See \"Patient Info\" tab in inbasket, or \"Choose Columns\" in Meds & Orders section of the refill encounter.            Passed - Medication is active on med list        Passed - Patient is age 18 or older        Passed - No active pregnancy on record        Passed - No positive pregnancy test in last 12 months             Swapna Carmen RN 07/13/23 9:06 AM  "

## 2023-09-05 ENCOUNTER — PATIENT OUTREACH (OUTPATIENT)
Dept: CARE COORDINATION | Facility: CLINIC | Age: 58
End: 2023-09-05
Payer: COMMERCIAL

## 2023-09-16 ENCOUNTER — HEALTH MAINTENANCE LETTER (OUTPATIENT)
Age: 58
End: 2023-09-16

## 2023-10-03 ENCOUNTER — PATIENT OUTREACH (OUTPATIENT)
Dept: CARE COORDINATION | Facility: CLINIC | Age: 58
End: 2023-10-03
Payer: COMMERCIAL

## 2024-02-03 ENCOUNTER — HEALTH MAINTENANCE LETTER (OUTPATIENT)
Age: 59
End: 2024-02-03

## 2024-03-28 ENCOUNTER — APPOINTMENT (RX ONLY)
Dept: URBAN - NONMETROPOLITAN AREA CLINIC 1 | Facility: CLINIC | Age: 59
Setting detail: DERMATOLOGY
End: 2024-03-28

## 2024-03-28 DIAGNOSIS — D18.0 HEMANGIOMA: ICD-10-CM | Status: STABLE

## 2024-03-28 DIAGNOSIS — L82.1 OTHER SEBORRHEIC KERATOSIS: ICD-10-CM | Status: STABLE

## 2024-03-28 DIAGNOSIS — D22 MELANOCYTIC NEVI: ICD-10-CM | Status: STABLE

## 2024-03-28 DIAGNOSIS — L81.4 OTHER MELANIN HYPERPIGMENTATION: ICD-10-CM | Status: STABLE

## 2024-03-28 DIAGNOSIS — L72.8 OTHER FOLLICULAR CYSTS OF THE SKIN AND SUBCUTANEOUS TISSUE: ICD-10-CM | Status: INADEQUATELY CONTROLLED

## 2024-03-28 PROBLEM — D22.5 MELANOCYTIC NEVI OF TRUNK: Status: ACTIVE | Noted: 2024-03-28

## 2024-03-28 PROBLEM — D18.01 HEMANGIOMA OF SKIN AND SUBCUTANEOUS TISSUE: Status: ACTIVE | Noted: 2024-03-28

## 2024-03-28 PROCEDURE — ? PHOTO-DOCUMENTATION

## 2024-03-28 PROCEDURE — ? COUNSELING

## 2024-03-28 PROCEDURE — ? SURGICAL DECISION MAKING

## 2024-03-28 PROCEDURE — ? OBSERVATION AND MEASURE

## 2024-03-28 PROCEDURE — 99203 OFFICE O/P NEW LOW 30 MIN: CPT

## 2024-03-28 ASSESSMENT — LOCATION SIMPLE DESCRIPTION DERM
LOCATION SIMPLE: RIGHT UPPER BACK
LOCATION SIMPLE: LEFT EYEBROW
LOCATION SIMPLE: UPPER BACK

## 2024-03-28 ASSESSMENT — LOCATION ZONE DERM
LOCATION ZONE: FACE
LOCATION ZONE: TRUNK

## 2024-03-28 ASSESSMENT — LOCATION DETAILED DESCRIPTION DERM
LOCATION DETAILED: INFERIOR THORACIC SPINE
LOCATION DETAILED: RIGHT INFERIOR MEDIAL UPPER BACK
LOCATION DETAILED: LEFT CENTRAL EYEBROW

## 2024-04-15 ENCOUNTER — APPOINTMENT (RX ONLY)
Dept: URBAN - NONMETROPOLITAN AREA CLINIC 1 | Facility: CLINIC | Age: 59
Setting detail: DERMATOLOGY
End: 2024-04-15

## 2024-04-15 DIAGNOSIS — D485 NEOPLASM OF UNCERTAIN BEHAVIOR OF SKIN: ICD-10-CM | Status: INADEQUATELY CONTROLLED

## 2024-04-15 PROBLEM — D48.5 NEOPLASM OF UNCERTAIN BEHAVIOR OF SKIN: Status: ACTIVE | Noted: 2024-04-15

## 2024-04-15 PROCEDURE — ? PUNCH EXCISION

## 2024-04-15 PROCEDURE — 11441 EXC FACE-MM B9+MARG 0.6-1 CM: CPT

## 2024-04-15 ASSESSMENT — LOCATION ZONE DERM: LOCATION ZONE: FACE

## 2024-04-15 ASSESSMENT — LOCATION SIMPLE DESCRIPTION DERM: LOCATION SIMPLE: LEFT EYEBROW

## 2024-04-15 ASSESSMENT — LOCATION DETAILED DESCRIPTION DERM: LOCATION DETAILED: LEFT CENTRAL EYEBROW

## 2024-04-15 NOTE — PROCEDURE: PUNCH EXCISION

## 2024-04-22 ENCOUNTER — APPOINTMENT (RX ONLY)
Dept: URBAN - NONMETROPOLITAN AREA CLINIC 1 | Facility: CLINIC | Age: 59
Setting detail: DERMATOLOGY
End: 2024-04-22

## 2024-04-22 DIAGNOSIS — Z48.02 ENCOUNTER FOR REMOVAL OF SUTURES: ICD-10-CM

## 2024-04-22 PROCEDURE — 99024 POSTOP FOLLOW-UP VISIT: CPT

## 2024-04-22 PROCEDURE — ? SUTURE REMOVAL (GLOBAL PERIOD)

## 2024-04-22 PROCEDURE — ? PHOTO-DOCUMENTATION

## 2024-04-22 ASSESSMENT — LOCATION DETAILED DESCRIPTION DERM: LOCATION DETAILED: LEFT CENTRAL EYEBROW

## 2024-04-22 ASSESSMENT — LOCATION SIMPLE DESCRIPTION DERM: LOCATION SIMPLE: LEFT EYEBROW

## 2024-04-22 ASSESSMENT — LOCATION ZONE DERM: LOCATION ZONE: FACE

## 2024-04-22 NOTE — PROCEDURE: SUTURE REMOVAL (GLOBAL PERIOD)
Detail Level: Detailed
Add 56883 Cpt? (Important Note: In 2017 The Use Of 48865 Is Being Tracked By Cms To Determine Future Global Period Reimbursement For Global Periods): yes

## 2024-11-09 ENCOUNTER — HEALTH MAINTENANCE LETTER (OUTPATIENT)
Age: 59
End: 2024-11-09